# Patient Record
Sex: MALE | Race: WHITE | NOT HISPANIC OR LATINO | Employment: FULL TIME | URBAN - NONMETROPOLITAN AREA
[De-identification: names, ages, dates, MRNs, and addresses within clinical notes are randomized per-mention and may not be internally consistent; named-entity substitution may affect disease eponyms.]

---

## 2021-02-26 ENCOUNTER — IMMUNIZATION (OUTPATIENT)
Dept: VACCINE CLINIC | Facility: HOSPITAL | Age: 66
End: 2021-02-26

## 2021-02-26 PROCEDURE — 91300 HC SARSCOV02 VAC 30MCG/0.3ML IM: CPT | Performed by: INTERNAL MEDICINE

## 2021-02-26 PROCEDURE — 0001A: CPT | Performed by: INTERNAL MEDICINE

## 2021-03-22 ENCOUNTER — IMMUNIZATION (OUTPATIENT)
Dept: VACCINE CLINIC | Facility: HOSPITAL | Age: 66
End: 2021-03-22

## 2021-03-22 PROCEDURE — 91300 HC SARSCOV02 VAC 30MCG/0.3ML IM: CPT | Performed by: INTERNAL MEDICINE

## 2021-03-22 PROCEDURE — 0002A: CPT | Performed by: INTERNAL MEDICINE

## 2022-08-23 ENCOUNTER — OFFICE VISIT (OUTPATIENT)
Dept: FAMILY MEDICINE CLINIC | Facility: CLINIC | Age: 67
End: 2022-08-23

## 2022-08-23 VITALS
WEIGHT: 256 LBS | DIASTOLIC BLOOD PRESSURE: 96 MMHG | OXYGEN SATURATION: 97 % | SYSTOLIC BLOOD PRESSURE: 140 MMHG | HEART RATE: 71 BPM | TEMPERATURE: 97.7 F | HEIGHT: 73 IN | RESPIRATION RATE: 20 BRPM | BODY MASS INDEX: 33.93 KG/M2

## 2022-08-23 DIAGNOSIS — Z00.00 ANNUAL PHYSICAL EXAM: ICD-10-CM

## 2022-08-23 DIAGNOSIS — R79.9 ABNORMAL FINDING OF BLOOD CHEMISTRY, UNSPECIFIED: ICD-10-CM

## 2022-08-23 DIAGNOSIS — Z00.00 ENCOUNTER FOR MEDICAL EXAMINATION TO ESTABLISH CARE: Primary | ICD-10-CM

## 2022-08-23 DIAGNOSIS — M10.09 ACUTE IDIOPATHIC GOUT OF MULTIPLE SITES: ICD-10-CM

## 2022-08-23 DIAGNOSIS — Z23 NEED FOR TDAP VACCINATION: ICD-10-CM

## 2022-08-23 DIAGNOSIS — Z12.5 ENCOUNTER FOR SCREENING FOR MALIGNANT NEOPLASM OF PROSTATE: ICD-10-CM

## 2022-08-23 DIAGNOSIS — I10 PRIMARY HYPERTENSION: ICD-10-CM

## 2022-08-23 DIAGNOSIS — Z23 PNEUMOCOCCAL VACCINE ADMINISTERED: ICD-10-CM

## 2022-08-23 DIAGNOSIS — M81.0 AGE-RELATED OSTEOPOROSIS WITHOUT CURRENT PATHOLOGICAL FRACTURE: ICD-10-CM

## 2022-08-23 DIAGNOSIS — Z13.6 SCREENING FOR AAA (AORTIC ABDOMINAL ANEURYSM): ICD-10-CM

## 2022-08-23 PROBLEM — M10.9 GOUT OF MULTIPLE SITES: Status: ACTIVE | Noted: 2022-08-23

## 2022-08-23 PROBLEM — M19.90 ARTHRITIS: Status: ACTIVE | Noted: 2022-08-23

## 2022-08-23 LAB
BILIRUB BLD-MCNC: NEGATIVE MG/DL
CLARITY, POC: CLEAR
COLOR UR: ABNORMAL
GLUCOSE UR STRIP-MCNC: NEGATIVE MG/DL
KETONES UR QL: ABNORMAL
LEUKOCYTE EST, POC: NEGATIVE
NITRITE UR-MCNC: NEGATIVE MG/ML
PH UR: 6 [PH] (ref 5–8)
PROT UR STRIP-MCNC: ABNORMAL MG/DL
RBC # UR STRIP: ABNORMAL /UL
SP GR UR: 1.03 (ref 1–1.03)
UROBILINOGEN UR QL: NORMAL

## 2022-08-23 PROCEDURE — 90677 PCV20 VACCINE IM: CPT | Performed by: PSYCHOLOGIST

## 2022-08-23 PROCEDURE — G0009 ADMIN PNEUMOCOCCAL VACCINE: HCPCS | Performed by: PSYCHOLOGIST

## 2022-08-23 PROCEDURE — 99387 INIT PM E/M NEW PAT 65+ YRS: CPT | Performed by: PSYCHOLOGIST

## 2022-08-23 PROCEDURE — 2014F MENTAL STATUS ASSESS: CPT | Performed by: PSYCHOLOGIST

## 2022-08-23 PROCEDURE — 90715 TDAP VACCINE 7 YRS/> IM: CPT | Performed by: PSYCHOLOGIST

## 2022-08-23 PROCEDURE — 81002 URINALYSIS NONAUTO W/O SCOPE: CPT | Performed by: PSYCHOLOGIST

## 2022-08-23 PROCEDURE — 90471 IMMUNIZATION ADMIN: CPT | Performed by: PSYCHOLOGIST

## 2022-08-23 PROCEDURE — 3008F BODY MASS INDEX DOCD: CPT | Performed by: PSYCHOLOGIST

## 2022-08-23 RX ORDER — HYDROCHLOROTHIAZIDE 25 MG/1
25 TABLET ORAL
Qty: 30 TABLET | Refills: 2 | Status: SHIPPED | OUTPATIENT
Start: 2022-08-23 | End: 2022-11-14

## 2022-08-23 RX ORDER — MELOXICAM 7.5 MG/1
7.5 TABLET ORAL DAILY
COMMUNITY
End: 2022-08-23 | Stop reason: SDUPTHER

## 2022-08-23 RX ORDER — MELOXICAM 7.5 MG/1
7.5 TABLET ORAL
Qty: 30 TABLET | Refills: 2 | Status: SHIPPED | OUTPATIENT
Start: 2022-08-23 | End: 2022-11-14 | Stop reason: SDUPTHER

## 2022-08-23 RX ORDER — ALLOPURINOL 100 MG/1
100 TABLET ORAL DAILY
COMMUNITY
Start: 2022-05-31 | End: 2022-10-10 | Stop reason: SDUPTHER

## 2022-08-23 RX ORDER — HYDROCHLOROTHIAZIDE 25 MG/1
25 TABLET ORAL 2 TIMES DAILY
COMMUNITY
End: 2022-08-23 | Stop reason: SDUPTHER

## 2022-08-23 NOTE — PROGRESS NOTES
"Chief Complaint  Establish Care (Continued care of past HTN (no longer takes med))    Subjective        Barrie Rose presents to [facility] PRIMARY CARE   C/o establish care as his PCP 2. Annual Physical  3. Chronic Illness 4. Med refill  Hypertension  This is a chronic problem. The current episode started more than 1 year ago. The problem has been waxing and waning since onset. The problem is uncontrolled. Pertinent negatives include no chest pain, headaches, palpitations or shortness of breath. Risk factors for coronary artery disease include male gender and obesity. Past treatments include lifestyle changes and diuretics. Current antihypertension treatment includes lifestyle changes and diuretics. The current treatment provides moderate improvement. There are no compliance problems.  There is no history of angina, kidney disease or CAD/MI. There is no history of chronic renal disease or a thyroid problem.   Arthritis  Presents for initial visit. The disease course has been fluctuating. The condition has lasted for 10 years. He complains of pain, stiffness and joint swelling. Affected locations include the right MCP, left MCP, right PIP, left hip, right hip, left DIP, right DIP, left PIP, right knee, left toes and right toes. Associated symptoms include pain at night. Pertinent negatives include no fever. Past treatments include NSAIDs. The treatment provided moderate relief. Compliance with prior treatments has been good.       Objective   Vital Signs:  /96 (BP Location: Left arm, Patient Position: Sitting, Cuff Size: Adult)   Pulse 71   Temp 97.7 °F (36.5 °C) (Temporal)   Resp 20   Ht 185.4 cm (73\")   Wt 116 kg (256 lb)   SpO2 97%   BMI 33.78 kg/m²   Estimated body mass index is 33.78 kg/m² as calculated from the following:    Height as of this encounter: 185.4 cm (73\").    Weight as of this encounter: 116 kg (256 lb).    BMI is >= 30 and <35. (Class 1 Obesity). The following " options were offered after discussion;: exercise counseling/recommendations and nutrition counseling/recommendations      Physical Exam  Vitals and nursing note reviewed.   Constitutional:       General: He is awake.      Appearance: Normal appearance. He is well-developed and well-groomed. He is obese.   HENT:      Head: Normocephalic and atraumatic.      Jaw: There is normal jaw occlusion.      Nose: Nose normal.      Mouth/Throat:      Mouth: Mucous membranes are moist.      Pharynx: Oropharynx is clear.   Eyes:      General: Lids are normal. Vision grossly intact. Gaze aligned appropriately.      Extraocular Movements: Extraocular movements intact.      Conjunctiva/sclera: Conjunctivae normal.      Pupils: Pupils are equal, round, and reactive to light.   Neck:      Trachea: Trachea and phonation normal.   Cardiovascular:      Rate and Rhythm: Normal rate and regular rhythm.      Pulses: Normal pulses.      Heart sounds: Normal heart sounds.   Pulmonary:      Effort: Pulmonary effort is normal.      Breath sounds: Normal breath sounds.   Abdominal:      General: Abdomen is protuberant. Bowel sounds are normal.      Palpations: Abdomen is soft.   Genitourinary:     Rectum: Normal.   Musculoskeletal:         General: Normal range of motion.      Cervical back: Full passive range of motion without pain, normal range of motion and neck supple.   Lymphadenopathy:      Cervical: No cervical adenopathy.   Skin:     General: Skin is warm.      Capillary Refill: Capillary refill takes less than 2 seconds.   Neurological:      General: No focal deficit present.      Mental Status: He is alert and oriented to person, place, and time.      Cranial Nerves: Cranial nerves are intact.      Sensory: Sensation is intact.      Motor: Motor function is intact.      Coordination: Coordination is intact.      Gait: Gait is intact.      Deep Tendon Reflexes: Reflexes are normal and symmetric.   Psychiatric:         Attention and  Perception: Attention and perception normal.         Mood and Affect: Mood and affect normal.         Speech: Speech normal.         Behavior: Behavior normal.         Thought Content: Thought content normal.         Cognition and Memory: Cognition and memory normal.         Judgment: Judgment normal.        Result Review :  The following data was reviewed by: Oseas Lam MD on 08/23/2022:  NONE REVIEWED NO [facility] MED RECORDS           Assessment and Plan   Diagnoses and all orders for this visit:    1. Encounter for medical examination to establish care (Primary)    2. Annual physical exam  -     CBC & Differential; Future  -     Comprehensive Metabolic Panel; Future  -     Lipid Panel; Future  -     Hemoglobin A1c; Future  -     TSH; Future  -     Hepatitis C Antibody; Future  -     PSA Screen; Future  -     Vitamin D 25 Hydroxy; Future  -     Vitamin B12; Future  -     Uric Acid  -     POC Urinalysis Dipstick    3. Screening for AAA (aortic abdominal aneurysm)  -     Abdominal Aortic Aneurysm Screening Medicare CAR; Future    4. Need for Tdap vaccination    5. Pneumococcal vaccine administered    6. Primary hypertension  Assessment & Plan:  Hypertension is improving with treatment.  Continue current treatment regimen.  Dietary sodium restriction.  Weight loss.  Regular aerobic exercise.  Blood pressure will be reassessed at the next regular appointment.    Orders:  -     CBC & Differential; Future  -     Comprehensive Metabolic Panel; Future  -     Lipid Panel; Future  -     Hemoglobin A1c; Future  -     TSH; Future  -     Hepatitis C Antibody; Future  -     PSA Screen; Future  -     Vitamin D 25 Hydroxy; Future  -     Vitamin B12; Future  -     Uric Acid  -     POC Urinalysis Dipstick    7. Acute idiopathic gout of multiple sites    8. Abnormal finding of blood chemistry, unspecified   -     Hemoglobin A1c; Future    9. Encounter for screening for malignant neoplasm of prostate   -     PSA Screen;  Future    10. Age-related osteoporosis without current pathological fracture   -     Vitamin D 25 Hydroxy; Future    Other orders  -     hydroCHLOROthiazide (HYDRODIURIL) 25 MG tablet; Take 1 tablet by mouth Daily With Breakfast for 90 days.  Dispense: 30 tablet; Refill: 2  -     meloxicam (MOBIC) 7.5 MG tablet; Take 1 tablet by mouth Daily With Lunch for 90 days.  Dispense: 30 tablet; Refill: 2  -     Tdap Vaccine Greater Than or Equal To 6yo IM  -     Pneumococcal Conjugate Vaccine 20-Valent (PCV20)        I spent 27 minutes caring for Barrie on this date of service. This time includes time spent by me in the following activities:reviewing tests, obtaining and/or reviewing a separately obtained history, performing a medically appropriate examination and/or evaluation , ordering medications, tests, or procedures and documenting information in the medical record     The preventive exam has been reviewed in detail.  The patient has been fully counseled on preventative guidelines for vaccines, cancer screenings, and other health maintenance needs.   The patient has been counseled on guidelines for maintaining a lifestyle to promote good health and to minimize chronic diseases.  The patient has been assisted with scheduling these healthcare procedures for the coming year and given a written document of health maintenance and anticipatory guidance for age with the AVS.     Follow Up   Return in about 1 day (around 8/24/2022) for RTC FASTING LABS & 11/23/22  3 MOS FOLOW UP /FATING LABS ( MED REFILL / CHRONIC ILLNESS ).  Patient was given instructions and counseling regarding his condition or for health maintenance advice. Please see specific information pulled into the AVS if appropriate.         This document has been electronically signed by Oseas Lam MD  August 24, 2022 12:20 EDT

## 2022-08-24 ENCOUNTER — LAB (OUTPATIENT)
Dept: FAMILY MEDICINE CLINIC | Facility: CLINIC | Age: 67
End: 2022-08-24

## 2022-08-24 DIAGNOSIS — R79.9 ABNORMAL FINDING OF BLOOD CHEMISTRY, UNSPECIFIED: ICD-10-CM

## 2022-08-24 DIAGNOSIS — Z12.5 ENCOUNTER FOR SCREENING FOR MALIGNANT NEOPLASM OF PROSTATE: ICD-10-CM

## 2022-08-24 DIAGNOSIS — Z00.00 ANNUAL PHYSICAL EXAM: ICD-10-CM

## 2022-08-24 DIAGNOSIS — M81.0 AGE-RELATED OSTEOPOROSIS WITHOUT CURRENT PATHOLOGICAL FRACTURE: ICD-10-CM

## 2022-08-24 DIAGNOSIS — I10 PRIMARY HYPERTENSION: ICD-10-CM

## 2022-08-24 LAB
ALBUMIN SERPL-MCNC: 4.16 G/DL (ref 3.5–5.2)
ALBUMIN/GLOB SERPL: 1.5 G/DL
ALP SERPL-CCNC: 75 U/L (ref 39–117)
ALT SERPL W P-5'-P-CCNC: 31 U/L (ref 1–41)
ANION GAP SERPL CALCULATED.3IONS-SCNC: 10.9 MMOL/L (ref 5–15)
AST SERPL-CCNC: 30 U/L (ref 1–40)
BASOPHILS # BLD AUTO: 0.05 10*3/MM3 (ref 0–0.2)
BASOPHILS NFR BLD AUTO: 0.6 % (ref 0–1.5)
BILIRUB SERPL-MCNC: 0.4 MG/DL (ref 0–1.2)
BUN SERPL-MCNC: 18 MG/DL (ref 8–23)
BUN/CREAT SERPL: 17.8 (ref 7–25)
CALCIUM SPEC-SCNC: 9.3 MG/DL (ref 8.6–10.5)
CHLORIDE SERPL-SCNC: 101 MMOL/L (ref 98–107)
CHOLEST SERPL-MCNC: 212 MG/DL (ref 0–200)
CO2 SERPL-SCNC: 28.1 MMOL/L (ref 22–29)
CREAT SERPL-MCNC: 1.01 MG/DL (ref 0.76–1.27)
DEPRECATED RDW RBC AUTO: 43.8 FL (ref 37–54)
EGFRCR SERPLBLD CKD-EPI 2021: 81 ML/MIN/1.73
EOSINOPHIL # BLD AUTO: 0.31 10*3/MM3 (ref 0–0.4)
EOSINOPHIL NFR BLD AUTO: 3.7 % (ref 0.3–6.2)
ERYTHROCYTE [DISTWIDTH] IN BLOOD BY AUTOMATED COUNT: 12.9 % (ref 12.3–15.4)
GLOBULIN UR ELPH-MCNC: 2.7 GM/DL
GLUCOSE SERPL-MCNC: 97 MG/DL (ref 65–99)
HBA1C MFR BLD: 5.8 % (ref 4.8–5.6)
HCT VFR BLD AUTO: 47.1 % (ref 37.5–51)
HCV AB SER DONR QL: NORMAL
HDLC SERPL-MCNC: 46 MG/DL (ref 40–60)
HGB BLD-MCNC: 16.2 G/DL (ref 13–17.7)
IMM GRANULOCYTES # BLD AUTO: 0.03 10*3/MM3 (ref 0–0.05)
IMM GRANULOCYTES NFR BLD AUTO: 0.4 % (ref 0–0.5)
LDLC SERPL CALC-MCNC: 131 MG/DL (ref 0–100)
LDLC/HDLC SERPL: 2.77 {RATIO}
LYMPHOCYTES # BLD AUTO: 2.35 10*3/MM3 (ref 0.7–3.1)
LYMPHOCYTES NFR BLD AUTO: 27.9 % (ref 19.6–45.3)
MCH RBC QN AUTO: 31.4 PG (ref 26.6–33)
MCHC RBC AUTO-ENTMCNC: 34.4 G/DL (ref 31.5–35.7)
MCV RBC AUTO: 91.3 FL (ref 79–97)
MONOCYTES # BLD AUTO: 0.78 10*3/MM3 (ref 0.1–0.9)
MONOCYTES NFR BLD AUTO: 9.3 % (ref 5–12)
NEUTROPHILS NFR BLD AUTO: 4.9 10*3/MM3 (ref 1.7–7)
NEUTROPHILS NFR BLD AUTO: 58.1 % (ref 42.7–76)
NRBC BLD AUTO-RTO: 0 /100 WBC (ref 0–0.2)
PLATELET # BLD AUTO: 200 10*3/MM3 (ref 140–450)
PMV BLD AUTO: 11.4 FL (ref 6–12)
POTASSIUM SERPL-SCNC: 3.7 MMOL/L (ref 3.5–5.2)
PROT SERPL-MCNC: 6.9 G/DL (ref 6–8.5)
RBC # BLD AUTO: 5.16 10*6/MM3 (ref 4.14–5.8)
SODIUM SERPL-SCNC: 140 MMOL/L (ref 136–145)
TRIGL SERPL-MCNC: 194 MG/DL (ref 0–150)
TSH SERPL DL<=0.05 MIU/L-ACNC: 2.24 UIU/ML (ref 0.27–4.2)
URATE SERPL-MCNC: 7.1 MG/DL (ref 3.4–7)
VLDLC SERPL-MCNC: 35 MG/DL (ref 5–40)
WBC NRBC COR # BLD: 8.42 10*3/MM3 (ref 3.4–10.8)

## 2022-08-24 PROCEDURE — 82306 VITAMIN D 25 HYDROXY: CPT | Performed by: PSYCHOLOGIST

## 2022-08-24 PROCEDURE — 84443 ASSAY THYROID STIM HORMONE: CPT | Performed by: PSYCHOLOGIST

## 2022-08-24 PROCEDURE — 82607 VITAMIN B-12: CPT | Performed by: PSYCHOLOGIST

## 2022-08-24 PROCEDURE — 83036 HEMOGLOBIN GLYCOSYLATED A1C: CPT | Performed by: PSYCHOLOGIST

## 2022-08-24 PROCEDURE — G0103 PSA SCREENING: HCPCS | Performed by: PSYCHOLOGIST

## 2022-08-24 PROCEDURE — 80061 LIPID PANEL: CPT | Performed by: PSYCHOLOGIST

## 2022-08-24 PROCEDURE — 86803 HEPATITIS C AB TEST: CPT | Performed by: PSYCHOLOGIST

## 2022-08-24 PROCEDURE — 84550 ASSAY OF BLOOD/URIC ACID: CPT | Performed by: PSYCHOLOGIST

## 2022-08-24 PROCEDURE — 80053 COMPREHEN METABOLIC PANEL: CPT | Performed by: PSYCHOLOGIST

## 2022-08-24 PROCEDURE — 85025 COMPLETE CBC W/AUTO DIFF WBC: CPT | Performed by: PSYCHOLOGIST

## 2022-08-25 LAB
25(OH)D3 SERPL-MCNC: 30.1 NG/ML (ref 30–100)
PSA SERPL-MCNC: 0.4 NG/ML (ref 0–4)
VIT B12 BLD-MCNC: 435 PG/ML (ref 211–946)

## 2022-08-26 NOTE — PROGRESS NOTES
Call patient regarding lab results and discussed those with him.  Advised him that his uric acid level was still elevated so we will adjust his dose and increase it to twice a day dosing for his allopurinol.  Patient understands.

## 2022-10-10 ENCOUNTER — TELEPHONE (OUTPATIENT)
Dept: FAMILY MEDICINE CLINIC | Facility: CLINIC | Age: 67
End: 2022-10-10

## 2022-10-10 RX ORDER — ALLOPURINOL 100 MG/1
100 TABLET ORAL DAILY
Status: CANCELLED | OUTPATIENT
Start: 2022-10-10

## 2022-10-10 RX ORDER — ALLOPURINOL 100 MG/1
100 TABLET ORAL DAILY
Qty: 30 TABLET | Refills: 2 | Status: SHIPPED | OUTPATIENT
Start: 2022-10-10 | End: 2023-01-08

## 2022-10-10 NOTE — TELEPHONE ENCOUNTER
Rx Refill Note  Requested Prescriptions     Pending Prescriptions Disp Refills   • allopurinol (ZYLOPRIM) 100 MG tablet       Sig: Take 1 tablet by mouth Daily.      Last office visit with prescribing clinician: 8/23/2022      Next office visit with prescribing clinician: 11/22/2022            Kisha Fry RN  10/10/22, 12:40 EDT

## 2022-10-10 NOTE — TELEPHONE ENCOUNTER
Caller: Barrie Rose    Relationship: Self    Best call back number: [phone]    Requested Prescriptions:   Requested Prescriptions     Pending Prescriptions Disp Refills   • allopurinol (ZYLOPRIM) 100 MG tablet       Sig: Take 1 tablet by mouth Daily.        Pharmacy where request should be sent: [id number] [place] [id number] AT [place] [address] & [facility] [phone] [facility] [phone] [facility]     Additional details provided by patient: COMPLETELY OUT OF MEDICATION    Does the patient have less than a 3 day supply:  [x] Yes  [] No    Ruben Mallory Rep   10/10/22 10:24 EDT

## 2022-11-14 ENCOUNTER — OFFICE VISIT (OUTPATIENT)
Dept: FAMILY MEDICINE CLINIC | Facility: CLINIC | Age: 67
End: 2022-11-14

## 2022-11-14 VITALS
SYSTOLIC BLOOD PRESSURE: 148 MMHG | RESPIRATION RATE: 20 BRPM | HEART RATE: 73 BPM | OXYGEN SATURATION: 99 % | HEIGHT: 73 IN | TEMPERATURE: 97.1 F | BODY MASS INDEX: 34.46 KG/M2 | DIASTOLIC BLOOD PRESSURE: 94 MMHG | WEIGHT: 260 LBS

## 2022-11-14 DIAGNOSIS — M19.90 ARTHRITIS: ICD-10-CM

## 2022-11-14 DIAGNOSIS — E78.2 MIXED HYPERLIPIDEMIA: ICD-10-CM

## 2022-11-14 DIAGNOSIS — I10 PRIMARY HYPERTENSION: Primary | ICD-10-CM

## 2022-11-14 DIAGNOSIS — J20.9 ACUTE BRONCHITIS, UNSPECIFIED ORGANISM: ICD-10-CM

## 2022-11-14 DIAGNOSIS — R05.1 ACUTE COUGH: ICD-10-CM

## 2022-11-14 DIAGNOSIS — Z79.899 MEDICATION DOSE INCREASED: ICD-10-CM

## 2022-11-14 LAB
EXPIRATION DATE: NORMAL
FLUAV AG UPPER RESP QL IA.RAPID: NOT DETECTED
FLUBV AG UPPER RESP QL IA.RAPID: NOT DETECTED
INTERNAL CONTROL: NORMAL
Lab: NORMAL
SARS-COV-2 AG UPPER RESP QL IA.RAPID: NOT DETECTED

## 2022-11-14 PROCEDURE — 87428 SARSCOV & INF VIR A&B AG IA: CPT | Performed by: PSYCHOLOGIST

## 2022-11-14 PROCEDURE — 99214 OFFICE O/P EST MOD 30 MIN: CPT | Performed by: PSYCHOLOGIST

## 2022-11-14 RX ORDER — ATORVASTATIN CALCIUM 20 MG/1
20 TABLET, FILM COATED ORAL NIGHTLY
Qty: 30 TABLET | Refills: 2 | Status: SHIPPED | OUTPATIENT
Start: 2022-11-14 | End: 2023-02-03

## 2022-11-14 RX ORDER — LISINOPRIL AND HYDROCHLOROTHIAZIDE 20; 12.5 MG/1; MG/1
1 TABLET ORAL
Qty: 30 TABLET | Refills: 2 | Status: SHIPPED | OUTPATIENT
Start: 2022-11-14 | End: 2022-11-22

## 2022-11-14 RX ORDER — GUAIFENESIN AND CODEINE PHOSPHATE 100; 10 MG/5ML; MG/5ML
5 SOLUTION ORAL 3 TIMES DAILY
Qty: 150 ML | Refills: 0 | Status: SHIPPED | OUTPATIENT
Start: 2022-11-14 | End: 2022-11-24

## 2022-11-14 RX ORDER — MELOXICAM 7.5 MG/1
7.5 TABLET ORAL
Qty: 30 TABLET | Refills: 2 | Status: SHIPPED | OUTPATIENT
Start: 2022-11-14 | End: 2023-02-12

## 2022-11-14 RX ORDER — AZITHROMYCIN 250 MG/1
TABLET, FILM COATED ORAL
Qty: 6 TABLET | Refills: 1 | Status: SHIPPED | OUTPATIENT
Start: 2022-11-14 | End: 2022-11-29

## 2022-11-14 NOTE — PROGRESS NOTES
Chief Complaint  Hypertension (X 2 weeks, headaches) and Cough (X 2 weeks)    Subjective        Barrie Rose presents to [facility] PRIMARY CARE   C.O FOLLOW UP CHRONIC ILLNESS 2. ACUTE COUGH - DENIES EXPOSURE TO COVID 2. MED REFILL   Hypertension  This is a chronic problem. The current episode started more than 1 year ago. The problem has been waxing and waning since onset. The problem is uncontrolled. Pertinent negatives include no chest pain, headaches, palpitations or shortness of breath. Risk factors for coronary artery disease include obesity, male gender, dyslipidemia and smoking/tobacco exposure. Past treatments include lifestyle changes and diuretics. Current antihypertension treatment includes lifestyle changes and diuretics. The current treatment provides mild improvement. There are no compliance problems.  There is no history of angina, kidney disease or CAD/MI. There is no history of chronic renal disease or a thyroid problem.   Cough  This is a new problem. The current episode started 1 to 4 weeks ago. The problem has been waxing and waning. The problem occurs every few hours. Associated symptoms include ear pain and rhinorrhea. Pertinent negatives include no chest pain, fever, headaches, sore throat or shortness of breath. He has tried OTC cough suppressant for the symptoms. The treatment provided mild relief. There is no history of asthma or COPD.   Hyperlipidemia  This is a new problem. This is a new diagnosis. The problem is uncontrolled. Recent lipid tests were reviewed and are high. Exacerbating diseases include obesity. He has no history of chronic renal disease. Factors aggravating his hyperlipidemia include smoking. Pertinent negatives include no chest pain, leg pain or shortness of breath. Current antihyperlipidemic treatment includes exercise and diet change. Risk factors for coronary artery disease include obesity and hypertension.   Arthritis  Presents for follow-up  "visit. The symptoms have been improving. Pertinent negatives include no dry mouth, fatigue or fever. Compliance with total regimen is %. Compliance with medications is %.       Objective   Vital Signs:  /94 (BP Location: Left arm, Patient Position: Sitting, Cuff Size: Adult)   Pulse 73   Temp 97.1 °F (36.2 °C) (Temporal)   Resp 20   Ht 185.4 cm (73\")   Wt 118 kg (260 lb)   SpO2 99%   BMI 34.30 kg/m²   Estimated body mass index is 34.3 kg/m² as calculated from the following:    Height as of this encounter: 185.4 cm (73\").    Weight as of this encounter: 118 kg (260 lb).    BMI is >= 30 and <35. (Class 1 Obesity). The following options were offered after discussion;: exercise counseling/recommendations and nutrition counseling/recommendations      Physical Exam  Vitals and nursing note reviewed.   Constitutional:       Appearance: Normal appearance.   HENT:      Head: Normocephalic.      Right Ear: Tympanic membrane is bulging.      Left Ear: Tympanic membrane is bulging.      Nose: Nose normal.      Mouth/Throat:      Mouth: Mucous membranes are moist.   Eyes:      Extraocular Movements: Extraocular movements intact.      Pupils: Pupils are equal, round, and reactive to light.   Cardiovascular:      Rate and Rhythm: Normal rate and regular rhythm.      Pulses: Normal pulses.      Heart sounds: Normal heart sounds.   Pulmonary:      Effort: Pulmonary effort is normal.      Breath sounds: Normal breath sounds. No decreased air movement. No wheezing.   Abdominal:      General: Abdomen is protuberant. Bowel sounds are normal.      Palpations: Abdomen is soft.   Musculoskeletal:         General: Normal range of motion.      Cervical back: Normal range of motion and neck supple.   Skin:     General: Skin is warm.      Capillary Refill: Capillary refill takes less than 2 seconds.   Neurological:      General: No focal deficit present.      Mental Status: He is alert and oriented to person, place, " and time.   Psychiatric:         Mood and Affect: Mood normal.        Result Review :  The following data was reviewed by: Oseas Lam MD on 11/14/2022:  Common labs    Common Labs 8/24/22 8/24/22 8/24/22 8/24/22 8/24/22 8/24/22    0902 0902 0902 0902 0902 0902   Glucose    97     BUN    18     Creatinine    1.01     Sodium    140     Potassium    3.7     Chloride    101     Calcium    9.3     Albumin    4.16     Total Bilirubin    0.4     Alkaline Phosphatase    75     AST (SGOT)    30     ALT (SGPT)    31     WBC 8.42        Hemoglobin 16.2        Hematocrit 47.1        Platelets 200        Total Cholesterol   212 (A)      Triglycerides   194 (A)      HDL Cholesterol   46      LDL Cholesterol    131 (A)      Hemoglobin A1C     5.80 (A)    PSA      0.401   Uric Acid  7.1 (A)       (A) Abnormal value                Assessment and Plan   Diagnoses and all orders for this visit:    1. Primary hypertension (Primary)  Comments:  WILL CHANGE BP MEDICATION AND JOAO IN 2 WEEKS  Assessment & Plan:  Hypertension is worsening.  Dietary sodium restriction.  Weight loss.  Regular aerobic exercise.  Stop smoking.  Medication changes per orders.  Blood pressure will be reassessed in 3 months.    Orders:  -     Lipid Panel; Future  -     Comprehensive Metabolic Panel; Future  -     Hemoglobin A1c; Future  -     Uric acid; Future    2. Mixed hyperlipidemia  Assessment & Plan:  Lipid abnormalities are newly identified.  Nutritional counseling was provided. and Pharmacotherapy as ordered.  Lipids will be reassessed in 3 months.    Orders:  -     Lipid Panel; Future  -     Comprehensive Metabolic Panel; Future  -     Hemoglobin A1c; Future  -     Uric acid; Future    3. Arthritis  Comments:  JOAO URIC ACID LEVEL   Orders:  -     Lipid Panel; Future  -     Comprehensive Metabolic Panel; Future  -     Hemoglobin A1c; Future  -     Uric acid; Future    4. Medication dose increased    5. Acute cough  Comments:  LABS:  COVID/FLU  A/ B   Orders:  -     POCT SARS-CoV-2 Antigen ANTONELLA + Flu    6. Acute bronchitis, unspecified organism  -     guaiFENesin-codeine (GUAIFENESIN AC) 100-10 MG/5ML liquid; Take 5 mL by mouth 3 (Three) Times a Day for 10 days.  Dispense: 150 mL; Refill: 0    Other orders  -     atorvastatin (Lipitor) 20 MG tablet; Take 1 tablet by mouth Every Night for 90 days.  Dispense: 30 tablet; Refill: 2  -     lisinopril-hydrochlorothiazide (PRINZIDE,ZESTORETIC) 20-12.5 MG per tablet; Take 1 tablet by mouth Daily With Breakfast for 90 days.  Dispense: 30 tablet; Refill: 2  -     meloxicam (MOBIC) 7.5 MG tablet; Take 1 tablet by mouth Daily With Lunch for 90 days.  Dispense: 30 tablet; Refill: 2  -     azithromycin (Zithromax) 250 MG tablet; Take 2 tablets the first day, then 1 tablet daily for 4 days.  Dispense: 6 tablet; Refill: 1      I spent 21 minutes caring for Barrie on this date of service. This time includes time spent by me in the following activities:reviewing tests, performing a medically appropriate examination and/or evaluation , counseling and educating the patient/family/caregiver, ordering medications, tests, or procedures and documenting information in the medical record     Follow Up   Return in about 2 weeks (around 11/28/2022) for Recheck-- BP CHANGE IN MEDICATION & , Recheck, RTC FASTING LABS ON 2/16/23.  Patient was given instructions and counseling regarding his condition or for health maintenance advice. Please see specific information pulled into the AVS if appropriate.           This document has been electronically signed by Oseas Lam MD  November 14, 2022 14:03 EST

## 2022-11-22 ENCOUNTER — TELEPHONE (OUTPATIENT)
Dept: FAMILY MEDICINE CLINIC | Facility: CLINIC | Age: 67
End: 2022-11-22

## 2022-11-22 RX ORDER — LOSARTAN POTASSIUM 25 MG/1
25 TABLET ORAL
Qty: 30 TABLET | Refills: 0 | Status: SHIPPED | OUTPATIENT
Start: 2022-11-22 | End: 2022-12-13

## 2022-11-22 NOTE — TELEPHONE ENCOUNTER
Caller: Barrie Rose    Relationship: Self    Best call back number:     [phone]        What medications are you currently taking:   Current Outpatient Medications on File Prior to Visit   Medication Sig Dispense Refill   • allopurinol (ZYLOPRIM) 100 MG tablet Take 1 tablet by mouth Daily for 90 days. 30 tablet 2   • atorvastatin (Lipitor) 20 MG tablet Take 1 tablet by mouth Every Night for 90 days. 30 tablet 2   • azithromycin (Zithromax) 250 MG tablet Take 2 tablets the first day, then 1 tablet daily for 4 days. 6 tablet 1   • guaiFENesin-codeine (GUAIFENESIN AC) 100-10 MG/5ML liquid Take 5 mL by mouth 3 (Three) Times a Day for 10 days. 150 mL 0   • lisinopril-hydrochlorothiazide (PRINZIDE,ZESTORETIC) 20-12.5 MG per tablet Take 1 tablet by mouth Daily With Breakfast for 90 days. 30 tablet 2   • meloxicam (MOBIC) 7.5 MG tablet Take 1 tablet by mouth Daily With Lunch for 90 days. 30 tablet 2     No current facility-administered medications on file prior to visit.          When did you start taking these medications: 11.14.22  Which medication are you concerned about: LISINOPRIL  Who prescribed you this medication:DR OCAMPO    What are your concerns: DIARRHEA AND HEADACHES , SO HE DISCONTINUED THE MEDICATION      PLEASE CALL AND ADVISE

## 2022-11-23 RX ORDER — LOSARTAN POTASSIUM 25 MG/1
TABLET ORAL
Qty: 90 TABLET | OUTPATIENT
Start: 2022-11-23

## 2022-11-29 ENCOUNTER — OFFICE VISIT (OUTPATIENT)
Dept: FAMILY MEDICINE CLINIC | Facility: CLINIC | Age: 67
End: 2022-11-29

## 2022-11-29 VITALS
HEART RATE: 73 BPM | WEIGHT: 259 LBS | TEMPERATURE: 97.1 F | BODY MASS INDEX: 34.33 KG/M2 | OXYGEN SATURATION: 96 % | SYSTOLIC BLOOD PRESSURE: 138 MMHG | DIASTOLIC BLOOD PRESSURE: 92 MMHG | RESPIRATION RATE: 20 BRPM | HEIGHT: 73 IN

## 2022-11-29 DIAGNOSIS — I10 PRIMARY HYPERTENSION: Primary | ICD-10-CM

## 2022-11-29 DIAGNOSIS — Z13.820 SCREENING FOR OSTEOPOROSIS: ICD-10-CM

## 2022-11-29 DIAGNOSIS — Z79.899 MEDICATION DOSE CHANGED: ICD-10-CM

## 2022-11-29 DIAGNOSIS — E78.2 MIXED HYPERLIPIDEMIA: ICD-10-CM

## 2022-11-29 DIAGNOSIS — I10 PRIMARY HYPERTENSION: ICD-10-CM

## 2022-11-29 DIAGNOSIS — M19.90 ARTHRITIS: ICD-10-CM

## 2022-11-29 DIAGNOSIS — Z01.89 EXAMINATION FOR, LABORATORY: ICD-10-CM

## 2022-11-29 LAB
ALBUMIN SERPL-MCNC: 4.09 G/DL (ref 3.5–5.2)
ALBUMIN/GLOB SERPL: 1.4 G/DL
ALP SERPL-CCNC: 79 U/L (ref 39–117)
ALT SERPL W P-5'-P-CCNC: 51 U/L (ref 1–41)
ANION GAP SERPL CALCULATED.3IONS-SCNC: 8.3 MMOL/L (ref 5–15)
AST SERPL-CCNC: 33 U/L (ref 1–40)
BILIRUB SERPL-MCNC: 0.5 MG/DL (ref 0–1.2)
BUN SERPL-MCNC: 17 MG/DL (ref 8–23)
BUN/CREAT SERPL: 18.3 (ref 7–25)
CALCIUM SPEC-SCNC: 8.9 MG/DL (ref 8.6–10.5)
CHLORIDE SERPL-SCNC: 102 MMOL/L (ref 98–107)
CHOLEST SERPL-MCNC: 141 MG/DL (ref 0–200)
CO2 SERPL-SCNC: 28.7 MMOL/L (ref 22–29)
CREAT SERPL-MCNC: 0.93 MG/DL (ref 0.76–1.27)
EGFRCR SERPLBLD CKD-EPI 2021: 89.4 ML/MIN/1.73
GLOBULIN UR ELPH-MCNC: 3 GM/DL
GLUCOSE SERPL-MCNC: 101 MG/DL (ref 65–99)
HBA1C MFR BLD: 5.5 % (ref 4.8–5.6)
HDLC SERPL-MCNC: 50 MG/DL (ref 40–60)
LDLC SERPL CALC-MCNC: 67 MG/DL (ref 0–100)
LDLC/HDLC SERPL: 1.27 {RATIO}
POTASSIUM SERPL-SCNC: 4 MMOL/L (ref 3.5–5.2)
PROT SERPL-MCNC: 7.1 G/DL (ref 6–8.5)
SODIUM SERPL-SCNC: 139 MMOL/L (ref 136–145)
TRIGL SERPL-MCNC: 137 MG/DL (ref 0–150)
URATE SERPL-MCNC: 6.8 MG/DL (ref 3.4–7)
VLDLC SERPL-MCNC: 24 MG/DL (ref 5–40)

## 2022-11-29 PROCEDURE — 84550 ASSAY OF BLOOD/URIC ACID: CPT | Performed by: PSYCHOLOGIST

## 2022-11-29 PROCEDURE — 99213 OFFICE O/P EST LOW 20 MIN: CPT | Performed by: PSYCHOLOGIST

## 2022-11-29 PROCEDURE — 83036 HEMOGLOBIN GLYCOSYLATED A1C: CPT | Performed by: PSYCHOLOGIST

## 2022-11-29 PROCEDURE — 80053 COMPREHEN METABOLIC PANEL: CPT | Performed by: PSYCHOLOGIST

## 2022-11-29 PROCEDURE — 80061 LIPID PANEL: CPT | Performed by: PSYCHOLOGIST

## 2022-11-29 RX ORDER — HYDROCHLOROTHIAZIDE 12.5 MG/1
12.5 TABLET ORAL
Qty: 30 TABLET | Refills: 2 | Status: SHIPPED | OUTPATIENT
Start: 2022-11-29 | End: 2022-12-13

## 2022-11-29 NOTE — PROGRESS NOTES
"Chief Complaint  Follow-up (Continuation of care rt bp med change.)    Subjective        Barrie Rose presents to [facility] PRIMARY CARE   C/O FOLLOW UP HYPERTENSION 2. FASTING LABS 3. NEW MEDICATION  Hypertension  This is a new problem. The current episode started 1 to 4 weeks ago. The problem has been waxing and waning since onset. The problem is uncontrolled. Pertinent negatives include no chest pain, headaches, palpitations or shortness of breath. Risk factors for coronary artery disease include obesity, smoking/tobacco exposure, male gender and dyslipidemia. Past treatments include lifestyle changes and angiotensin blockers. Current antihypertension treatment includes lifestyle changes, angiotensin blockers and diuretics. The current treatment provides moderate improvement. There are no compliance problems.  There is no history of angina, kidney disease or CAD/MI. There is no history of chronic renal disease or a thyroid problem.       Objective   Vital Signs:  /92 (BP Location: Left arm, Patient Position: Sitting, Cuff Size: Adult)   Pulse 73   Temp 97.1 °F (36.2 °C) (Skin)   Resp 20   Ht 185.4 cm (72.99\")   Wt 117 kg (259 lb)   SpO2 96%   BMI 34.18 kg/m²   Estimated body mass index is 34.18 kg/m² as calculated from the following:    Height as of this encounter: 185.4 cm (72.99\").    Weight as of this encounter: 117 kg (259 lb).    BMI is >= 30 and <35. (Class 1 Obesity). The following options were offered after discussion;: exercise counseling/recommendations and nutrition counseling/recommendations      Physical Exam  Vitals and nursing note reviewed.   Constitutional:       Appearance: Normal appearance. He is obese.   HENT:      Head: Normocephalic.      Right Ear: Tympanic membrane normal.      Left Ear: Tympanic membrane normal.      Nose: Nose normal.      Mouth/Throat:      Mouth: Mucous membranes are moist.   Eyes:      Extraocular Movements: Extraocular movements " intact.      Pupils: Pupils are equal, round, and reactive to light.   Cardiovascular:      Rate and Rhythm: Normal rate and regular rhythm.      Pulses: Normal pulses.      Heart sounds: Normal heart sounds.   Pulmonary:      Effort: Pulmonary effort is normal.      Breath sounds: Normal breath sounds.   Abdominal:      General: Abdomen is protuberant. Bowel sounds are normal.      Palpations: Abdomen is soft.   Musculoskeletal:         General: Normal range of motion.      Cervical back: Normal range of motion and neck supple.   Skin:     General: Skin is warm.      Capillary Refill: Capillary refill takes less than 2 seconds.   Neurological:      General: No focal deficit present.      Mental Status: He is alert and oriented to person, place, and time.   Psychiatric:         Mood and Affect: Mood normal.        Result Review :  The following data was reviewed by: Oseas Lam MD on 11/29/2022:  Common labs    Common Labs 8/24/22 8/24/22 8/24/22 8/24/22 8/24/22 8/24/22    0902 0902 0902 0902 0902 0902   Glucose    97     BUN    18     Creatinine    1.01     Sodium    140     Potassium    3.7     Chloride    101     Calcium    9.3     Albumin    4.16     Total Bilirubin    0.4     Alkaline Phosphatase    75     AST (SGOT)    30     ALT (SGPT)    31     WBC 8.42        Hemoglobin 16.2        Hematocrit 47.1        Platelets 200        Total Cholesterol   212 (A)      Triglycerides   194 (A)      HDL Cholesterol   46      LDL Cholesterol    131 (A)      Hemoglobin A1C     5.80 (A)    PSA      0.401   Uric Acid  7.1 (A)       (A) Abnormal value              Assessment and Plan   Diagnoses and all orders for this visit:    1. Primary hypertension (Primary)  Comments:  ADJUSTED MEDS TODAY AND JOAO AGAIN IN 2-3 WEEKS    2. Medication dose changed    3. Examination for, laboratory    4. Screening for osteoporosis      I spent 20 minutes caring for Barrie on this date of service. This time includes time spent by  me in the following activities:reviewing tests, performing a medically appropriate examination and/or evaluation , counseling and educating the patient/family/caregiver, ordering medications, tests, or procedures and documenting information in the medical record     Follow Up   Return in about 2 weeks (around 12/13/2022) for Recheck-- BP NEW MED STARTED .  Patient was given instructions and counseling regarding his condition or for health maintenance advice. Please see specific information pulled into the AVS if appropriate.           This document has been electronically signed by Oseas Lam MD  November 29, 2022 10:56 EST

## 2022-12-05 NOTE — PROGRESS NOTES
Call patient regarding lab results and reassured.  I advised him that all of his labs were really good this time and a lot of test results are normal.  He will continue taking his medication and lifestyle modification with diet and exercise.  Patient reassured.

## 2022-12-08 RX ORDER — AZITHROMYCIN 250 MG/1
TABLET, FILM COATED ORAL
Qty: 6 TABLET | Refills: 1 | OUTPATIENT
Start: 2022-12-08

## 2022-12-13 ENCOUNTER — TELEMEDICINE (OUTPATIENT)
Dept: FAMILY MEDICINE CLINIC | Facility: CLINIC | Age: 67
End: 2022-12-13

## 2022-12-13 DIAGNOSIS — Z79.899 MEDICATION DOSE CHANGED: ICD-10-CM

## 2022-12-13 DIAGNOSIS — M54.9 MUSCULOSKELETAL BACK PAIN: ICD-10-CM

## 2022-12-13 DIAGNOSIS — I10 PRIMARY HYPERTENSION: Primary | ICD-10-CM

## 2022-12-13 PROCEDURE — 99213 OFFICE O/P EST LOW 20 MIN: CPT | Performed by: PSYCHOLOGIST

## 2022-12-13 RX ORDER — METHOCARBAMOL 750 MG/1
750 TABLET, FILM COATED ORAL 3 TIMES DAILY
Qty: 30 TABLET | Refills: 1 | Status: SHIPPED | OUTPATIENT
Start: 2022-12-13 | End: 2022-12-23

## 2022-12-13 RX ORDER — LOSARTAN POTASSIUM AND HYDROCHLOROTHIAZIDE 12.5; 5 MG/1; MG/1
1 TABLET ORAL DAILY
Qty: 30 TABLET | Refills: 2 | Status: SHIPPED | OUTPATIENT
Start: 2022-12-13 | End: 2023-01-13 | Stop reason: SDUPTHER

## 2022-12-14 NOTE — ASSESSMENT & PLAN NOTE
Hypertension is improving with treatment.  Continue current treatment regimen.  Dietary sodium restriction.  Weight loss.  Regular aerobic exercise.  Blood pressure will be reassessed at the next regular appointment.     The patient reports adjusting his medication at home where he was taking 2 of the 12.5 mg hydrochlorothiazide.  At this time I advised the patient that we will finish off the current medications that he has with his current dosing.  And I will adjust his medication further and have him take losartan 50 mg with 12.5 mg hydrochlorothiazide.  He was advised to continue monitoring blood pressure at home weekly.  We will see him back in the office for the follow-up visit with his new medication.  He is to call the office if he has any additional symptoms or unable to tolerate current dose.  Patient understanding counseled.

## 2022-12-14 NOTE — PROGRESS NOTES
Subjective   Barrie Rose is a 67 y.o. male for telehealth video visit as her PCP.  This visit is to follow-up on his blood pressure.  He was monitoring his blood pressure at home.  Start him on medications to help better control his blood pressure.  His last blood pressure check at home was 120/79.  He is currently on losartan and hydrochlorothiazide.  He reports that if he takes 2 of the 12.5 mg hydrochlorothiazide he finds that his blood pressure is under better control.  He is currently on losartan 25 mg.  He denies any chest pain or shortness of air today.  #2 he is complaining of back pain.  He was raking leaves this past Friday and continues to have lower back pain.  He denies any fall or any injury.  He reports having had sciatica in the past and it feels like it is doing it again with this acute injury.    Hypertension  This is a new problem. The current episode started 1 to 4 weeks ago. The problem has been waxing and waning since onset. Pertinent negatives include no chest pain, headaches, palpitations or shortness of breath. Risk factors for coronary artery disease include obesity, male gender and dyslipidemia. Past treatments include lifestyle changes, diuretics and angiotensin blockers. Current antihypertension treatment includes lifestyle changes, diuretics and angiotensin blockers. The current treatment provides moderate improvement. There are no compliance problems.  There is no history of angina, kidney disease or CAD/MI. There is no history of chronic renal disease or a thyroid problem.   Back Pain  This is a new problem. The current episode started in the past 7 days. The problem occurs intermittently. The problem has been waxing and waning since onset. The pain is present in the lumbar spine. The pain radiates to the right thigh, right knee and right foot. The pain is at a severity of 5/10. The pain is moderate. The pain is worse during the day. The symptoms are aggravated by bending, lying  down, position, standing and twisting. Associated symptoms include numbness (radiates to RLE). Pertinent negatives include no chest pain or headaches.        The following portions of the patient's history were reviewed and updated as appropriate: allergies, current medications, past family history, past medical history, past social history, past surgical history and problem list.    Review of Systems   Respiratory: Negative for shortness of breath.    Cardiovascular: Negative for chest pain and palpitations.   Musculoskeletal: Positive for back pain.   Neurological: Positive for numbness (radiates to RLE). Negative for headaches.     See history of Present Illness     Objective     Virtual Visit Physical Exam    PHQ-2/PHQ-9 Depression Screening 8/23/2022   Little Interest or Pleasure in Doing Things 0-->not at all   Feeling Down, Depressed or Hopeless 0-->not at all   PHQ-9: Brief Depression Severity Measure Score 0         Assessment & Plan     Problems Addressed this Visit        Cardiac and Vasculature    Primary hypertension - Primary     Hypertension is improving with treatment.  Continue current treatment regimen.  Dietary sodium restriction.  Weight loss.  Regular aerobic exercise.  Blood pressure will be reassessed at the next regular appointment.     The patient reports adjusting his medication at home where he was taking 2 of the 12.5 mg hydrochlorothiazide.  At this time I advised the patient that we will finish off the current medications that he has with his current dosing.  And I will adjust his medication further and have him take losartan 50 mg with 12.5 mg hydrochlorothiazide.  He was advised to continue monitoring blood pressure at home weekly.  We will see him back in the office for the follow-up visit with his new medication.  He is to call the office if he has any additional symptoms or unable to tolerate current dose.  Patient understanding counseled.         Relevant Medications     losartan-hydrochlorothiazide (Hyzaar) 50-12.5 MG per tablet   Other Visit Diagnoses     Medication dose changed        Musculoskeletal back pain        If symptoms worsen to please call us and schedule appointment.      Diagnoses       Codes Comments    Primary hypertension    -  Primary ICD-10-CM: I10  ICD-9-CM: 401.9 Continue blood pressure monitoring at home.    Medication dose changed     ICD-10-CM: Z79.899  ICD-9-CM: V58.69     Musculoskeletal back pain     ICD-10-CM: M54.9  ICD-9-CM: 724.5 If symptoms worsen to please call us and schedule appointment.          You have chosen to receive care through a telephone visit. Do you consent to use a telephone visit for your medical care today? Yes    This visit has been rescheduled as a phone visit to comply with patient safety concerns in accordance with CDC recommendations. Total time of discussion was 15 minutes.            This document has been electronically signed by Oseas Lam MD  December 14, 2022 10:29 EST    Part of this note may be an electronic transcription/translation of spoken language to printed text using the Dragon Dictation System.

## 2023-01-13 ENCOUNTER — OFFICE VISIT (OUTPATIENT)
Dept: FAMILY MEDICINE CLINIC | Facility: CLINIC | Age: 68
End: 2023-01-13
Payer: COMMERCIAL

## 2023-01-13 VITALS
SYSTOLIC BLOOD PRESSURE: 126 MMHG | HEART RATE: 75 BPM | DIASTOLIC BLOOD PRESSURE: 70 MMHG | BODY MASS INDEX: 34.06 KG/M2 | HEIGHT: 73 IN | TEMPERATURE: 96.6 F | OXYGEN SATURATION: 96 % | WEIGHT: 257 LBS

## 2023-01-13 DIAGNOSIS — Z79.899 MEDICATION DOSE INCREASED: ICD-10-CM

## 2023-01-13 DIAGNOSIS — I10 PRIMARY HYPERTENSION: Primary | ICD-10-CM

## 2023-01-13 PROCEDURE — 99213 OFFICE O/P EST LOW 20 MIN: CPT | Performed by: PSYCHOLOGIST

## 2023-01-13 RX ORDER — LOSARTAN POTASSIUM AND HYDROCHLOROTHIAZIDE 12.5; 1 MG/1; MG/1
1 TABLET ORAL
Qty: 30 TABLET | Refills: 2 | Status: SHIPPED | OUTPATIENT
Start: 2023-01-13 | End: 2023-02-16 | Stop reason: SDUPTHER

## 2023-01-13 RX ORDER — HYDROCHLOROTHIAZIDE 12.5 MG/1
TABLET ORAL
COMMUNITY
Start: 2022-12-31 | End: 2023-01-13 | Stop reason: SDUPTHER

## 2023-01-13 NOTE — PROGRESS NOTES
"Chief Complaint  Follow-up (BP meds adjusted, and back pain.)    Subjective        Barrie Rose presents to [facility] c/o follow up HTN:   Hypertension  This is a chronic problem. The current episode started more than 1 year ago. The problem has been waxing and waning since onset. The problem is uncontrolled. Pertinent negatives include no chest pain, headaches, palpitations or shortness of breath. Risk factors for coronary artery disease include obesity, male gender and dyslipidemia. Past treatments include lifestyle changes. Current antihypertension treatment includes lifestyle changes.       Objective   Vital Signs:  /70   Pulse 75   Temp 96.6 °F (35.9 °C)   Ht 185.4 cm (72.99\")   Wt 117 kg (257 lb)   SpO2 96%   BMI 33.91 kg/m²   Estimated body mass index is 33.91 kg/m² as calculated from the following:    Height as of this encounter: 185.4 cm (72.99\").    Weight as of this encounter: 117 kg (257 lb).    BMI is >= 30 and <35. (Class 1 Obesity). The following options were offered after discussion;: exercise counseling/recommendations and nutrition counseling/recommendations      Physical Exam  Vitals and nursing note reviewed.   Constitutional:       Appearance: Normal appearance. He is obese.   HENT:      Head: Normocephalic.      Right Ear: Tympanic membrane normal.      Left Ear: Tympanic membrane normal.      Nose: Nose normal.      Mouth/Throat:      Mouth: Mucous membranes are moist.   Eyes:      Extraocular Movements: Extraocular movements intact.      Pupils: Pupils are equal, round, and reactive to light.   Cardiovascular:      Rate and Rhythm: Normal rate and regular rhythm.      Pulses: Normal pulses.      Heart sounds: Normal heart sounds.   Pulmonary:      Effort: Pulmonary effort is normal.      Breath sounds: Normal breath sounds.   Abdominal:      General: Abdomen is protuberant. Bowel sounds are normal.      Palpations: Abdomen is soft. "   Musculoskeletal:         General: Normal range of motion.      Cervical back: Normal range of motion and neck supple.   Skin:     General: Skin is warm.      Capillary Refill: Capillary refill takes less than 2 seconds.   Neurological:      General: No focal deficit present.      Mental Status: He is alert and oriented to person, place, and time.   Psychiatric:         Mood and Affect: Mood normal.        Result Review :  The following data was reviewed by: Oseas Lam MD on 01/13/2023:  Common labs    Common Labs 8/24/22 8/24/22 8/24/22 8/24/22 8/24/22 8/24/22 11/29/22 11/29/22 11/29/22 11/29/22    0902 0902 0902 0902 0902 0902 1108 1108 1108 1108   Glucose    97     101 (A)    BUN    18     17    Creatinine    1.01     0.93    Sodium    140     139    Potassium    3.7     4.0    Chloride    101     102    Calcium    9.3     8.9    Albumin    4.16     4.09    Total Bilirubin    0.4     0.5    Alkaline Phosphatase    75     79    AST (SGOT)    30     33    ALT (SGPT)    31     51 (A)    WBC 8.42            Hemoglobin 16.2            Hematocrit 47.1            Platelets 200            Total Cholesterol   212 (A)       141   Triglycerides   194 (A)       137   HDL Cholesterol   46       50   LDL Cholesterol    131 (A)       67   Hemoglobin A1C     5.80 (A)  5.50      PSA      0.401       Uric Acid  7.1 (A)      6.8     (A) Abnormal value            Data reviewed: Radiologic studies 11/29/2022 DEXA    Assessment and Plan   Diagnoses and all orders for this visit:    1. Primary hypertension (Primary)    2. Medication dose increased    Other orders  -     losartan-hydrochlorothiazide (HYZAAR) 100-12.5 MG per tablet; Take 1 tablet by mouth Daily With Breakfast for 90 days.  Dispense: 30 tablet; Refill: 2      I spent 20 minutes caring for Barrie on this date of service. This time includes time spent by me in the following activities:reviewing tests, performing a medically appropriate examination and/or  evaluation , counseling and educating the patient/family/caregiver, ordering medications, tests, or procedures and documenting information in the medical record       Follow Up   Return in about 11 days (around 1/24/2023), or if symptoms worsen or fail to improve, for Recheck-- BP change in med .  Patient was given instructions and counseling regarding his condition or for health maintenance advice. Please see specific information pulled into the AVS if appropriate.           This document has been electronically signed by Oseas Lam MD  January 13, 2023 12:59 EST

## 2023-01-24 ENCOUNTER — OFFICE VISIT (OUTPATIENT)
Dept: FAMILY MEDICINE CLINIC | Facility: CLINIC | Age: 68
End: 2023-01-24
Payer: COMMERCIAL

## 2023-01-24 VITALS
HEART RATE: 73 BPM | OXYGEN SATURATION: 96 % | BODY MASS INDEX: 34.35 KG/M2 | WEIGHT: 259.2 LBS | TEMPERATURE: 98 F | SYSTOLIC BLOOD PRESSURE: 128 MMHG | HEIGHT: 73 IN | DIASTOLIC BLOOD PRESSURE: 84 MMHG

## 2023-01-24 DIAGNOSIS — I10 PRIMARY HYPERTENSION: Primary | ICD-10-CM

## 2023-01-24 PROCEDURE — 99213 OFFICE O/P EST LOW 20 MIN: CPT | Performed by: PSYCHOLOGIST

## 2023-01-24 NOTE — PROGRESS NOTES
"Chief Complaint  Hypertension    Subjective        Barrie Rose presents to [facility] C/O FOLLOWUP CHRONIC ILLNESS:   Hypertension  This is a chronic problem. The current episode started more than 1 year ago. The problem has been gradually improving since onset. The problem is controlled. Pertinent negatives include no chest pain, headaches, palpitations or shortness of breath. Risk factors for coronary artery disease include male gender, obesity and dyslipidemia. Past treatments include lifestyle changes, diuretics and angiotensin blockers. Current antihypertension treatment includes lifestyle changes, diuretics and angiotensin blockers. The current treatment provides moderate improvement. There are no compliance problems.  There is no history of angina, kidney disease or CAD/MI. There is no history of chronic renal disease or a thyroid problem.       Objective   Vital Signs:  /84 (BP Location: Right arm, Patient Position: Sitting, Cuff Size: Adult)   Pulse 73   Temp 98 °F (36.7 °C) (Temporal)   Ht 185.4 cm (72.99\")   Wt 118 kg (259 lb 3.2 oz)   SpO2 96%   BMI 34.20 kg/m²   Estimated body mass index is 34.2 kg/m² as calculated from the following:    Height as of this encounter: 185.4 cm (72.99\").    Weight as of this encounter: 118 kg (259 lb 3.2 oz).    BMI is >= 30 and <35. (Class 1 Obesity). The following options were offered after discussion;: exercise counseling/recommendations and nutrition counseling/recommendations      Physical Exam  Vitals and nursing note reviewed.   Constitutional:       Appearance: Normal appearance. He is obese.   HENT:      Head: Normocephalic.      Right Ear: Tympanic membrane normal.      Left Ear: Tympanic membrane normal.      Nose: Nose normal.      Mouth/Throat:      Mouth: Mucous membranes are moist.   Eyes:      Extraocular Movements: Extraocular movements intact.      Pupils: Pupils are equal, round, and reactive to light. "   Cardiovascular:      Rate and Rhythm: Normal rate and regular rhythm.      Pulses: Normal pulses.      Heart sounds: Normal heart sounds.   Pulmonary:      Effort: Pulmonary effort is normal.      Breath sounds: Normal breath sounds.   Abdominal:      General: Abdomen is protuberant. Bowel sounds are normal.      Palpations: Abdomen is soft.   Musculoskeletal:         General: Normal range of motion.      Cervical back: Normal range of motion and neck supple.   Skin:     General: Skin is warm.      Capillary Refill: Capillary refill takes less than 2 seconds.   Neurological:      General: No focal deficit present.      Mental Status: He is alert and oriented to person, place, and time.   Psychiatric:         Mood and Affect: Mood normal.        Result Review :  The following data was reviewed by: Oseas Lam MD on 01/24/2023:  Common labs    Common Labs 8/24/22 8/24/22 8/24/22 8/24/22 8/24/22 8/24/22 11/29/22 11/29/22 11/29/22 11/29/22    0902 0902 0902 0902 0902 0902 1108 1108 1108 1108   Glucose    97     101 (A)    BUN    18     17    Creatinine    1.01     0.93    Sodium    140     139    Potassium    3.7     4.0    Chloride    101     102    Calcium    9.3     8.9    Albumin    4.16     4.09    Total Bilirubin    0.4     0.5    Alkaline Phosphatase    75     79    AST (SGOT)    30     33    ALT (SGPT)    31     51 (A)    WBC 8.42            Hemoglobin 16.2            Hematocrit 47.1            Platelets 200            Total Cholesterol   212 (A)       141   Triglycerides   194 (A)       137   HDL Cholesterol   46       50   LDL Cholesterol    131 (A)       67   Hemoglobin A1C     5.80 (A)  5.50      PSA      0.401       Uric Acid  7.1 (A)      6.8     (A) Abnormal value              Assessment and Plan   Diagnoses and all orders for this visit:    1. Primary hypertension (Primary)  Comments:  HE IS DOING WELL WITH THE ADJUSTED MEDICATION / BP MONITORED AT HOME   -112/ 90/70  Assessment &  Plan:  Hypertension is improving with treatment.  Continue current treatment regimen.  Dietary sodium restriction.  Weight loss.  Regular aerobic exercise.  Blood pressure will be reassessed at the next regular appointment.      I spent 20 minutes caring for Barrie on this date of service. This time includes time spent by me in the following activities:reviewing tests, performing a medically appropriate examination and/or evaluation , counseling and educating the patient/family/caregiver, ordering medications, tests, or procedures and documenting information in the medical record       Follow Up   Return if symptoms worsen or fail to improve/ RTC, for ALREADY HAS A FOLLOW UP APPT 2/16.  Patient was given instructions and counseling regarding his condition or for health maintenance advice. Please see specific information pulled into the AVS if appropriate.           This document has been electronically signed by Oseas Lam MD  January 24, 2023 14:10 EST

## 2023-02-03 RX ORDER — ATORVASTATIN CALCIUM 20 MG/1
TABLET, FILM COATED ORAL
Qty: 30 TABLET | Refills: 2 | Status: SHIPPED | OUTPATIENT
Start: 2023-02-03 | End: 2023-03-06 | Stop reason: SDUPTHER

## 2023-02-03 RX ORDER — LISINOPRIL AND HYDROCHLOROTHIAZIDE 20; 12.5 MG/1; MG/1
TABLET ORAL
Qty: 30 TABLET | Refills: 2 | OUTPATIENT
Start: 2023-02-03

## 2023-02-16 ENCOUNTER — OFFICE VISIT (OUTPATIENT)
Dept: FAMILY MEDICINE CLINIC | Facility: CLINIC | Age: 68
End: 2023-02-16
Payer: COMMERCIAL

## 2023-02-16 VITALS
HEART RATE: 87 BPM | RESPIRATION RATE: 18 BRPM | HEIGHT: 73 IN | OXYGEN SATURATION: 96 % | SYSTOLIC BLOOD PRESSURE: 132 MMHG | TEMPERATURE: 97.3 F | WEIGHT: 240.6 LBS | DIASTOLIC BLOOD PRESSURE: 84 MMHG | BODY MASS INDEX: 31.89 KG/M2

## 2023-02-16 DIAGNOSIS — M54.40 ACUTE RIGHT-SIDED LOW BACK PAIN WITH SCIATICA, SCIATICA LATERALITY UNSPECIFIED: ICD-10-CM

## 2023-02-16 DIAGNOSIS — I10 PRIMARY HYPERTENSION: Primary | ICD-10-CM

## 2023-02-16 DIAGNOSIS — M10.09 ACUTE IDIOPATHIC GOUT OF MULTIPLE SITES: ICD-10-CM

## 2023-02-16 PROCEDURE — 96372 THER/PROPH/DIAG INJ SC/IM: CPT | Performed by: PSYCHOLOGIST

## 2023-02-16 PROCEDURE — 99213 OFFICE O/P EST LOW 20 MIN: CPT | Performed by: PSYCHOLOGIST

## 2023-02-16 RX ORDER — KETOROLAC TROMETHAMINE 30 MG/ML
60 INJECTION, SOLUTION INTRAMUSCULAR; INTRAVENOUS ONCE
Status: COMPLETED | OUTPATIENT
Start: 2023-02-16 | End: 2023-02-16

## 2023-02-16 RX ORDER — METHOCARBAMOL 750 MG/1
TABLET, FILM COATED ORAL
COMMUNITY
Start: 2023-02-12

## 2023-02-16 RX ORDER — INDOMETHACIN 50 MG/1
CAPSULE ORAL
Qty: 30 CAPSULE | Refills: 0 | Status: SHIPPED | OUTPATIENT
Start: 2023-02-16

## 2023-02-16 RX ORDER — LOSARTAN POTASSIUM AND HYDROCHLOROTHIAZIDE 12.5; 1 MG/1; MG/1
1 TABLET ORAL
Qty: 30 TABLET | Refills: 2 | Status: SHIPPED | OUTPATIENT
Start: 2023-02-16 | End: 2023-05-17

## 2023-02-16 RX ADMIN — KETOROLAC TROMETHAMINE 60 MG: 30 INJECTION, SOLUTION INTRAMUSCULAR; INTRAVENOUS at 10:36

## 2023-02-16 NOTE — PROGRESS NOTES
Chief Complaint  Follow-up (Pt is f/u for HTN)    Subjective        Barrie Rose presents to [facility]   C/o follow up hypertension 2. Back pain / sciatica 3. Gout flare up / his right big toe hurting   Hypertension  This is a chronic problem. The current episode started more than 1 year ago. The problem has been resolved since onset. The problem is controlled. Pertinent negatives include no chest pain, headaches, palpitations or shortness of breath. Risk factors for coronary artery disease include male gender, obesity and dyslipidemia. Past treatments include lifestyle changes and ACE inhibitors. Current antihypertension treatment includes lifestyle changes, diuretics and ACE inhibitors. There are no compliance problems.  There is no history of angina, kidney disease or CAD/MI. There is no history of chronic renal disease or a thyroid problem.   Arthritis  Presents for follow-up visit. He complains of pain, joint swelling and joint warmth. The symptoms have been improving. Affected locations include the right toes. His pain is at a severity of 7/10. Associated symptoms include pain at night. Pertinent negatives include no fever. Compliance with total regimen is %. Compliance with medications is %.   Back Pain  This is a recurrent problem. The current episode started 1 to 4 weeks ago. The problem occurs constantly. The problem has been gradually worsening since onset. The pain is present in the lumbar spine. The pain is at a severity of 8/10. The pain is moderate. The pain is the same all the time. The symptoms are aggravated by bending, lying down, sitting and standing. Pertinent negatives include no chest pain, fever, headaches or numbness. He has tried chiropractic manipulation and bed rest for the symptoms. The treatment provided mild relief.       Objective   Vital Signs:  /84 (BP Location: Left arm, Patient Position: Sitting, Cuff Size: Adult)   Pulse 87    "Temp 97.3 °F (36.3 °C) (Temporal)   Resp 18   Ht 185.4 cm (73\")   Wt 109 kg (240 lb 9.6 oz)   SpO2 96%   BMI 31.74 kg/m²   Estimated body mass index is 31.74 kg/m² as calculated from the following:    Height as of this encounter: 185.4 cm (73\").    Weight as of this encounter: 109 kg (240 lb 9.6 oz).    BMI is >= 30 and <35. (Class 1 Obesity). The following options were offered after discussion;: exercise counseling/recommendations and nutrition counseling/recommendations      Physical Exam  Vitals and nursing note reviewed.   Constitutional:       Appearance: Normal appearance. He is obese.   HENT:      Head: Normocephalic.      Right Ear: Tympanic membrane normal.      Left Ear: Tympanic membrane normal.      Nose: Nose normal.      Mouth/Throat:      Mouth: Mucous membranes are moist.   Eyes:      Extraocular Movements: Extraocular movements intact.      Pupils: Pupils are equal, round, and reactive to light.   Cardiovascular:      Rate and Rhythm: Normal rate and regular rhythm.      Pulses: Normal pulses.      Heart sounds: Normal heart sounds.   Pulmonary:      Effort: Pulmonary effort is normal.      Breath sounds: Normal breath sounds.   Abdominal:      General: Abdomen is protuberant. Bowel sounds are normal.      Palpations: Abdomen is soft.   Musculoskeletal:         General: Normal range of motion.      Cervical back: Normal range of motion and neck supple.   Feet:      Comments: Right great toe swollen and red / and medially erythema  Skin:     General: Skin is warm.      Capillary Refill: Capillary refill takes less than 2 seconds.   Neurological:      General: No focal deficit present.      Mental Status: He is alert and oriented to person, place, and time.   Psychiatric:         Mood and Affect: Mood normal.        Result Review :  The following data was reviewed by: Oseas Lam MD on 02/16/2023:  Common labs    Common Labs 8/24/22 8/24/22 8/24/22 8/24/22 8/24/22 8/24/22 11/29/22 " 11/29/22 11/29/22 11/29/22    0902 0902 0902 0902 0902 0902 1108 1108 1108 1108   Glucose    97     101 (A)    BUN    18     17    Creatinine    1.01     0.93    Sodium    140     139    Potassium    3.7     4.0    Chloride    101     102    Calcium    9.3     8.9    Albumin    4.16     4.09    Total Bilirubin    0.4     0.5    Alkaline Phosphatase    75     79    AST (SGOT)    30     33    ALT (SGPT)    31     51 (A)    WBC 8.42            Hemoglobin 16.2            Hematocrit 47.1            Platelets 200            Total Cholesterol   212 (A)       141   Triglycerides   194 (A)       137   HDL Cholesterol   46       50   LDL Cholesterol    131 (A)       67   Hemoglobin A1C     5.80 (A)  5.50      PSA      0.401       Uric Acid  7.1 (A)      6.8     (A) Abnormal value            Data reviewed: Radiologic studies 11/29/2022 DEXA          Assessment and Plan   Diagnoses and all orders for this visit:    1. Primary hypertension (Primary)  Comments:  BP MONITORED AT HOME 140-120/ 93-75  Assessment & Plan:  Hypertension is improving with treatment.  Continue current treatment regimen.  Dietary sodium restriction.  Weight loss.  Regular aerobic exercise.  Blood pressure will be reassessed at the next regular appointment.      2. Acute idiopathic gout of multiple sites    3. Acute right-sided low back pain with sciatica, sciatica laterality unspecified  -     ketorolac (TORADOL) injection 60 mg    Other orders  -     losartan-hydrochlorothiazide (HYZAAR) 100-12.5 MG per tablet; Take 1 tablet by mouth Daily With Breakfast for 90 days.  Dispense: 30 tablet; Refill: 2  -     indomethacin (INDOCIN) 50 MG capsule; Take 1 tablet tid x 4 days then 1 tab bid x 3 days then 1 tab daily x 7days with food Start tomorrow 2/17/23  Dispense: 30 capsule; Refill: 0         I spent 20 minutes caring for Barrie on this date of service. This time includes time spent by me in the following activities:reviewing tests, performing a medically  appropriate examination and/or evaluation , counseling and educating the patient/family/caregiver, ordering medications, tests, or procedures and documenting information in the medical record       Follow Up   Return in about 3 months (around 5/16/2023) for Recheck, RTC FASTING LABS ( chronic illness / med refill) .  Patient was given instructions and counseling regarding his condition or for health maintenance advice. Please see specific information pulled into the AVS if appropriate.           This document has been electronically signed by Oseas Lam MD  February 16, 2023 10:30 EST

## 2023-03-06 RX ORDER — MELOXICAM 7.5 MG/1
TABLET ORAL
Qty: 60 TABLET | Refills: 0 | Status: SHIPPED | OUTPATIENT
Start: 2023-03-06

## 2023-03-06 RX ORDER — ATORVASTATIN CALCIUM 20 MG/1
20 TABLET, FILM COATED ORAL NIGHTLY
Qty: 30 TABLET | Refills: 2 | Status: SHIPPED | OUTPATIENT
Start: 2023-03-06

## 2023-03-06 NOTE — TELEPHONE ENCOUNTER
Rx Refill Note  Requested Prescriptions     Pending Prescriptions Disp Refills   • meloxicam (MOBIC) 7.5 MG tablet [Pharmacy Med Name: MELOXICAM 7.5 MG TABLET] 60 tablet 0     Sig: TAKE 1 TABLET BY MOUTH DAILY WITH LUNCH   • atorvastatin (LIPITOR) 20 MG tablet 30 tablet 2     Sig: Take 1 tablet by mouth Every Night.      Last office visit with prescribing clinician: 2/16/2023   Last telemedicine visit with prescribing clinician: 5/16/2023   Next office visit with prescribing clinician: 5/16/2023                         Would you like a call back once the refill request has been completed: [] Yes [] No    If the office needs to give you a call back, can they leave a voicemail: [] Yes [] No    Kisha Fry RN  03/06/23, 11:32 EST

## 2023-04-17 RX ORDER — LOSARTAN POTASSIUM AND HYDROCHLOROTHIAZIDE 12.5; 1 MG/1; MG/1
TABLET ORAL
Qty: 90 TABLET | Refills: 0 | Status: SHIPPED | OUTPATIENT
Start: 2023-04-17

## 2023-04-17 NOTE — TELEPHONE ENCOUNTER
Caller: Barrie Rose    Relationship: Self    Best call back number: [phone]    Requested Prescriptions:   Requested Prescriptions     Pending Prescriptions Disp Refills   • losartan-hydrochlorothiazide (HYZAAR) 100-12.5 MG per tablet [Pharmacy Med Name: LOSARTAN-HCTZ 100-12.5 MG TAB] 90 tablet      Sig: TAKE 1 TABLET BY MOUTH DAILY WITH BREAKFAST        Pharmacy where request should be sent: [facility] [id number] [place] [id number] - [phone] [facility] [phone]      Last office visit with prescribing clinician: 2/16/2023   Last telemedicine visit with prescribing clinician: 5/16/2023   Next office visit with prescribing clinician: 5/16/2023     Additional details provided by patient: COMPLETELY OUT OF MEDICATIONS    Does the patient have less than a 3 day supply:  [x] Yes  [] No    Would you like a call back once the refill request has been completed: [x] Yes [] No    If the office needs to give you a call back, can they leave a voicemail: [x] Yes [] No    Ruben Gibson Rep   04/17/23 11:55 EDT

## 2023-05-16 ENCOUNTER — TELEPHONE (OUTPATIENT)
Dept: FAMILY MEDICINE CLINIC | Facility: CLINIC | Age: 68
End: 2023-05-16

## 2023-05-16 ENCOUNTER — OFFICE VISIT (OUTPATIENT)
Dept: FAMILY MEDICINE CLINIC | Facility: CLINIC | Age: 68
End: 2023-05-16
Payer: COMMERCIAL

## 2023-05-16 VITALS
WEIGHT: 255.4 LBS | TEMPERATURE: 96.9 F | HEIGHT: 73 IN | DIASTOLIC BLOOD PRESSURE: 78 MMHG | OXYGEN SATURATION: 97 % | RESPIRATION RATE: 18 BRPM | HEART RATE: 71 BPM | SYSTOLIC BLOOD PRESSURE: 112 MMHG | BODY MASS INDEX: 33.85 KG/M2

## 2023-05-16 DIAGNOSIS — E78.2 MIXED HYPERLIPIDEMIA: Primary | ICD-10-CM

## 2023-05-16 DIAGNOSIS — M10.09 ACUTE IDIOPATHIC GOUT OF MULTIPLE SITES: ICD-10-CM

## 2023-05-16 DIAGNOSIS — I10 PRIMARY HYPERTENSION: ICD-10-CM

## 2023-05-16 DIAGNOSIS — Z91.81 AT LOW RISK FOR FALL: ICD-10-CM

## 2023-05-16 DIAGNOSIS — Z12.11 ENCOUNTER FOR SCREENING COLONOSCOPY: ICD-10-CM

## 2023-05-16 LAB
ALBUMIN SERPL-MCNC: 4.3 G/DL (ref 3.5–5.2)
ALBUMIN/GLOB SERPL: 1.3 G/DL
ALP SERPL-CCNC: 84 U/L (ref 39–117)
ALT SERPL W P-5'-P-CCNC: 29 U/L (ref 1–41)
ANION GAP SERPL CALCULATED.3IONS-SCNC: 7.5 MMOL/L (ref 5–15)
AST SERPL-CCNC: 25 U/L (ref 1–40)
BILIRUB SERPL-MCNC: 0.7 MG/DL (ref 0–1.2)
BUN SERPL-MCNC: 21 MG/DL (ref 8–23)
BUN/CREAT SERPL: 19.4 (ref 7–25)
CALCIUM SPEC-SCNC: 9.5 MG/DL (ref 8.6–10.5)
CHLORIDE SERPL-SCNC: 104 MMOL/L (ref 98–107)
CHOLEST SERPL-MCNC: 136 MG/DL (ref 0–200)
CO2 SERPL-SCNC: 30.5 MMOL/L (ref 22–29)
CREAT SERPL-MCNC: 1.08 MG/DL (ref 0.76–1.27)
EGFRCR SERPLBLD CKD-EPI 2021: 74.7 ML/MIN/1.73
GLOBULIN UR ELPH-MCNC: 3.2 GM/DL
GLUCOSE SERPL-MCNC: 105 MG/DL (ref 65–99)
HDLC SERPL-MCNC: 55 MG/DL (ref 40–60)
LDLC SERPL CALC-MCNC: 57 MG/DL (ref 0–100)
LDLC/HDLC SERPL: 0.96 {RATIO}
POTASSIUM SERPL-SCNC: 4.1 MMOL/L (ref 3.5–5.2)
PROT SERPL-MCNC: 7.5 G/DL (ref 6–8.5)
SODIUM SERPL-SCNC: 142 MMOL/L (ref 136–145)
TRIGL SERPL-MCNC: 140 MG/DL (ref 0–150)
VLDLC SERPL-MCNC: 24 MG/DL (ref 5–40)

## 2023-05-16 PROCEDURE — 99214 OFFICE O/P EST MOD 30 MIN: CPT | Performed by: PSYCHOLOGIST

## 2023-05-16 PROCEDURE — 80061 LIPID PANEL: CPT | Performed by: PSYCHOLOGIST

## 2023-05-16 PROCEDURE — 80053 COMPREHEN METABOLIC PANEL: CPT | Performed by: PSYCHOLOGIST

## 2023-05-16 RX ORDER — ALLOPURINOL 100 MG/1
100 TABLET ORAL
Qty: 30 TABLET | Refills: 5 | Status: SHIPPED | OUTPATIENT
Start: 2023-05-16 | End: 2023-05-16 | Stop reason: SDUPTHER

## 2023-05-16 RX ORDER — MELOXICAM 7.5 MG/1
7.5 TABLET ORAL
Qty: 30 TABLET | Refills: 5 | Status: SHIPPED | OUTPATIENT
Start: 2023-05-16 | End: 2023-11-12

## 2023-05-16 RX ORDER — MELOXICAM 7.5 MG/1
7.5 TABLET ORAL
Qty: 30 TABLET | Refills: 5 | Status: SHIPPED | OUTPATIENT
Start: 2023-05-16 | End: 2023-05-16 | Stop reason: SDUPTHER

## 2023-05-16 RX ORDER — ALLOPURINOL 100 MG/1
100 TABLET ORAL
Qty: 30 TABLET | Refills: 5 | Status: SHIPPED | OUTPATIENT
Start: 2023-05-16 | End: 2023-11-12

## 2023-05-16 RX ORDER — CYCLOBENZAPRINE HCL 5 MG
1 TABLET ORAL AS NEEDED
COMMUNITY
Start: 2023-02-21 | End: 2023-05-16

## 2023-05-16 RX ORDER — ATORVASTATIN CALCIUM 20 MG/1
20 TABLET, FILM COATED ORAL NIGHTLY
Qty: 30 TABLET | Refills: 5 | Status: SHIPPED | OUTPATIENT
Start: 2023-05-16 | End: 2023-05-16 | Stop reason: SDUPTHER

## 2023-05-16 RX ORDER — CELECOXIB 200 MG/1
2 CAPSULE ORAL DAILY
COMMUNITY
Start: 2023-02-21

## 2023-05-16 RX ORDER — METHOCARBAMOL 750 MG/1
750 TABLET, FILM COATED ORAL 3 TIMES DAILY
Qty: 30 TABLET | Refills: 0 | Status: SHIPPED | OUTPATIENT
Start: 2023-05-16 | End: 2023-05-26

## 2023-05-16 RX ORDER — METHOCARBAMOL 750 MG/1
750 TABLET, FILM COATED ORAL 3 TIMES DAILY
Qty: 30 TABLET | Refills: 0 | Status: SHIPPED | OUTPATIENT
Start: 2023-05-16 | End: 2023-05-16 | Stop reason: SDUPTHER

## 2023-05-16 RX ORDER — ATORVASTATIN CALCIUM 20 MG/1
20 TABLET, FILM COATED ORAL NIGHTLY
Qty: 30 TABLET | Refills: 5 | Status: SHIPPED | OUTPATIENT
Start: 2023-05-16 | End: 2023-11-12

## 2023-05-16 NOTE — TELEPHONE ENCOUNTER
Caller: MiltonBarrie [email or web address]    Relationship: Self    Best call back number:  [phone]    Requested Prescriptions:   Requested Prescriptions      No prescriptions requested or ordered in this encounter      allopurinol (Zyloprim) 100 MG tablet    atorvastatin (LIPITOR) 20 MG tablet    meloxicam (MOBIC) 7.5 MG tablet    methocarbamol (ROBAXIN) 750 MG tablet    Pharmacy where request should be sent:      [facility] [id number] [place] [id number] - [phone] [facility] [phone] FX       Last office visit with prescribing clinician: 5/16/2023   Last telemedicine visit with prescribing clinician: 5/16/2023   Next office visit with prescribing clinician: 8/16/2023     Additional details provided by patient:     MEDICATIONS WERE CALLED IN TO A [facility] IN [place].  PATIENT NOT SURE WHY.  PLEASE SEND THEM TO [facility] IN [place]     LIZETH Li   05/16/23 15:58 EDT

## 2023-05-16 NOTE — PROGRESS NOTES
"Chief Complaint  Follow-up (Cont. Care of HTN, Gout. Pt is fasting for labs. Needs rf of allopurinol.)    Subjective        Barrie Rose presents to [facility]   C/O follow up chronic illness 2/ Fasting labs   Hypertension  This is a chronic problem. The current episode started more than 1 year ago. The problem has been resolved since onset. The problem is uncontrolled. Pertinent negatives include no chest pain, headaches, palpitations or shortness of breath. Risk factors for coronary artery disease include obesity, male gender and dyslipidemia. Past treatments include lifestyle changes, diuretics and angiotensin blockers. Current antihypertension treatment includes lifestyle changes, diuretics and angiotensin blockers. The current treatment provides moderate improvement. There are no compliance problems.  There is no history of angina, kidney disease or CAD/MI. There is no history of chronic renal disease or a thyroid problem.   Hyperlipidemia  This is a chronic problem. The current episode started more than 1 year ago. The problem is controlled. Recent lipid tests were reviewed and are normal. Exacerbating diseases include obesity. He has no history of chronic renal disease. Pertinent negatives include no chest pain, leg pain or shortness of breath. Current antihyperlipidemic treatment includes statins. The current treatment provides moderate improvement of lipids. There are no compliance problems.  Risk factors for coronary artery disease include obesity and hypertension.   Arthritis  Presents for follow-up visit. The symptoms have been improving. Compliance with total regimen is %. Compliance with medications is %.       Objective   Vital Signs:  /78 (BP Location: Left arm, Patient Position: Sitting, Cuff Size: Adult)   Pulse 71   Temp 96.9 °F (36.1 °C) (Temporal)   Resp 18   Ht 185.4 cm (73\")   Wt 116 kg (255 lb 6.4 oz)   SpO2 97%   BMI 33.70 kg/m² " "  Estimated body mass index is 33.7 kg/m² as calculated from the following:    Height as of this encounter: 185.4 cm (73\").    Weight as of this encounter: 116 kg (255 lb 6.4 oz).    BMI is >= 30 and <35. (Class 1 Obesity). The following options were offered after discussion;: exercise counseling/recommendations and nutrition counseling/recommendations      Physical Exam  Vitals and nursing note reviewed.   Constitutional:       Appearance: Normal appearance. He is obese.   HENT:      Head: Normocephalic.      Right Ear: Tympanic membrane normal.      Left Ear: Tympanic membrane normal.      Nose: Nose normal.      Mouth/Throat:      Mouth: Mucous membranes are moist.   Eyes:      Extraocular Movements: Extraocular movements intact.      Pupils: Pupils are equal, round, and reactive to light.   Cardiovascular:      Rate and Rhythm: Normal rate and regular rhythm.      Pulses: Normal pulses.      Heart sounds: Normal heart sounds.   Pulmonary:      Effort: Pulmonary effort is normal.      Breath sounds: Normal breath sounds.   Abdominal:      General: Abdomen is protuberant. Bowel sounds are normal.      Palpations: Abdomen is soft.   Musculoskeletal:         General: Normal range of motion.      Cervical back: Normal range of motion and neck supple.   Skin:     General: Skin is warm.      Capillary Refill: Capillary refill takes less than 2 seconds.   Neurological:      General: No focal deficit present.      Mental Status: He is alert and oriented to person, place, and time.   Psychiatric:         Mood and Affect: Mood normal.        Result Review :  The following data was reviewed by: Oseas Lam MD on 05/16/2023:  Common labs        8/24/2022    09:02 11/29/2022    11:08   Common Labs   Glucose 97   101     BUN 18   17     Creatinine 1.01   0.93     Sodium 140   139     Potassium 3.7   4.0     Chloride 101   102     Calcium 9.3   8.9     Albumin 4.16   4.09     Total Bilirubin 0.4   0.5     Alkaline " Phosphatase 75   79     AST (SGOT) 30   33     ALT (SGPT) 31   51     WBC 8.42      Hemoglobin 16.2      Hematocrit 47.1      Platelets 200      Total Cholesterol 212   141     Triglycerides 194   137     HDL Cholesterol 46   50     LDL Cholesterol  131   67     Hemoglobin A1C 5.80   5.50     PSA 0.401      Uric Acid 7.1   6.8       Data reviewed: Radiologic studies 11/29/22 DEXA          Assessment and Plan   Diagnoses and all orders for this visit:    1. Mixed hyperlipidemia (Primary)  Assessment & Plan:  Lipid abnormalities are improving with treatment.  Nutritional counseling was provided. and Pharmacotherapy as ordered.  Lipids will be reassessed in 3 months.      2. Primary hypertension  Assessment & Plan:  Hypertension is improving with treatment.  Continue current treatment regimen.  Dietary sodium restriction.  Weight loss.  Regular aerobic exercise.  Blood pressure will be reassessed at the next regular appointment.      3. Acute idiopathic gout of multiple sites    4. At low risk for fall    5. Encounter for screening colonoscopy        I spent 20 minutes caring for Barrie on this date of service. This time includes time spent by me in the following activities:reviewing tests, performing a medically appropriate examination and/or evaluation , counseling and educating the patient/family/caregiver, ordering medications, tests, or procedures and documenting information in the medical record     Follow Up   Return in about 3 months (around 8/30/2023) for Annual physical, RTC FASTING LABS.  Patient was given instructions and counseling regarding his condition or for health maintenance advice. Please see specific information pulled into the AVS if appropriate.           This document has been electronically signed by Oseas Lam MD  May 16, 2023 11:06 EDT

## 2023-05-16 NOTE — PATIENT INSTRUCTIONS
Fall Prevention in the Home, Adult  Falls can cause injuries and affect people of all ages. There are many simple things that you can do to make your home safe and to help prevent falls. Ask for help when making these changes, if needed.  What actions can I take to prevent falls?  General instructions  • Use good lighting in all rooms. Replace any light bulbs that burn out, turn on lights if it is dark, and use night-lights.  • Place frequently used items in easy-to-reach places. Lower the shelves around your home if necessary.  • Set up furniture so that there are clear paths around it. Avoid moving your furniture around.  • Remove throw rugs and other tripping hazards from the floor.  • Avoid walking on wet floors.  • Fix any uneven floor surfaces.  • Add color or contrast paint or tape to grab bars and handrails in your home. Place contrasting color strips on the first and last steps of staircases.  • When you use a stepladder, make sure that it is completely opened and that the sides and supports are firmly locked. Have someone hold the ladder while you are using it. Do not climb a closed stepladder.  • Know where your pets are when moving through your home.  What can I do in the bathroom?     • Keep the floor dry. Immediately clean up any water that is on the floor.  • Remove soap buildup in the tub or shower regularly.  • Use nonskid mats or decals on the floor of the tub or shower.  • Attach bath mats securely with double-sided, nonslip rug tape.  • If you need to sit down while you are in the shower, use a plastic, nonslip stool.  • Install grab bars by the toilet and in the tub and shower. Do not use towel bars as grab bars.  What can I do in the bedroom?  • Make sure that a bedside light is easy to reach.  • Do not use oversized bedding that reaches the floor.  • Have a firm chair that has side arms to use for getting dressed.  What can I do in the kitchen?  • Clean up any spills right away.  • If you  need to reach for something above you, use a sturdy step stool that has a grab bar.  • Keep electrical cables out of the way.  • Do not use floor polish or wax that makes floors slippery. If you must use wax, make sure that it is non-skid floor wax.  What can I do with my stairs?  • Do not leave any items on the stairs.  • Make sure that you have a light switch at the top and the bottom of the stairs. Have them installed if you do not have them.  • Make sure that there are handrails on both sides of the stairs. Fix handrails that are broken or loose. Make sure that handrails are as long as the staircases.  • Install non-slip stair treads on all stairs in your home.  • Avoid having throw rugs at the top or bottom of stairs, or secure the rugs with carpet tape to prevent them from moving.  • Choose a carpet design that does not hide the edge of steps on the stairs.  • Check any carpeting to make sure that it is firmly attached to the stairs. Fix any carpet that is loose or worn.  What can I do on the outside of my home?  • Use bright outdoor lighting.  • Regularly repair the edges of walkways and driveways and fix any cracks.  • Remove high doorway thresholds.  • Trim any shrubbery on the main path into your home.  • Regularly check that handrails are securely fastened and in good repair. Both sides of all steps should have handrails.  • Install guardrails along the edges of any raised decks or porches.  • Clear walkways of debris and clutter, including tools and rocks.  • Have leaves, snow, and ice cleared regularly.  • Use sand or salt on walkways during winter months.  • In the garage, clean up any spills right away, including grease or oil spills.  What other actions can I take?  • Wear closed-toe shoes that fit well and support your feet. Wear shoes that have rubber soles or low heels.  • Use mobility aids as needed, such as canes, walkers, scooters, and crutches.  • Review your medicines with your health care  provider. Some medicines can cause dizziness or changes in blood pressure, which increase your risk of falling.  Talk with your health care provider about other ways that you can decrease your risk of falls. This may include working with a physical therapist or  to improve your strength, balance, and endurance.  Where to find more information  • Centers for Disease Control and Prevention, [place]: www.cdc.gov  • National [place] on Aging: www.ryne.nih.gov  Contact a health care provider if:  • You are afraid of falling at home.  • You feel weak, drowsy, or dizzy at home.  • You fall at home.  Summary  • There are many simple things that you can do to make your home safe and to help prevent falls.  • Ways to make your home safe include removing tripping hazards and installing grab bars in the bathroom.  • Ask for help when making these changes in your home.  This information is not intended to replace advice given to you by your health care provider. Make sure you discuss any questions you have with your health care provider.  Document Revised: 09/19/2022 Document Reviewed: 07/21/2021  Elsevier Patient Education © 2022 [employer]

## 2023-05-16 NOTE — TELEPHONE ENCOUNTER
Medications were sent to wrong pharmacy by mistake. Resent what was sent at today's visit to correct pharmacy.

## 2023-08-15 RX ORDER — LOSARTAN POTASSIUM AND HYDROCHLOROTHIAZIDE 12.5; 1 MG/1; MG/1
TABLET ORAL
Qty: 90 TABLET | Refills: 0 | Status: SHIPPED | OUTPATIENT
Start: 2023-08-15

## 2023-08-16 ENCOUNTER — OFFICE VISIT (OUTPATIENT)
Dept: FAMILY MEDICINE CLINIC | Facility: CLINIC | Age: 68
End: 2023-08-16
Payer: COMMERCIAL

## 2023-08-16 VITALS
BODY MASS INDEX: 33.4 KG/M2 | WEIGHT: 252 LBS | RESPIRATION RATE: 18 BRPM | OXYGEN SATURATION: 97 % | HEIGHT: 73 IN | SYSTOLIC BLOOD PRESSURE: 122 MMHG | DIASTOLIC BLOOD PRESSURE: 92 MMHG | HEART RATE: 74 BPM

## 2023-08-16 DIAGNOSIS — Z12.5 SCREENING FOR PROSTATE CANCER: ICD-10-CM

## 2023-08-16 DIAGNOSIS — Z00.00 ENCOUNTER FOR ANNUAL PHYSICAL EXAM: Primary | ICD-10-CM

## 2023-08-16 DIAGNOSIS — E78.2 MIXED HYPERLIPIDEMIA: ICD-10-CM

## 2023-08-16 DIAGNOSIS — I10 PRIMARY HYPERTENSION: ICD-10-CM

## 2023-08-16 DIAGNOSIS — M10.09 ACUTE IDIOPATHIC GOUT OF MULTIPLE SITES: ICD-10-CM

## 2023-08-16 LAB
BILIRUB BLD-MCNC: NEGATIVE MG/DL
CLARITY, POC: CLEAR
COLOR UR: ABNORMAL
GLUCOSE UR STRIP-MCNC: NEGATIVE MG/DL
KETONES UR QL: NEGATIVE
LEUKOCYTE EST, POC: NEGATIVE
NITRITE UR-MCNC: NEGATIVE MG/ML
PH UR: 6 [PH] (ref 5–8)
PROT UR STRIP-MCNC: NEGATIVE MG/DL
RBC # UR STRIP: ABNORMAL /UL
SP GR UR: 1.03 (ref 1–1.03)
UROBILINOGEN UR QL: NORMAL

## 2023-08-16 NOTE — PROGRESS NOTES
"Chief Complaint  Annual Exam (Annual Physical./)    Subjective        Barrie Rose presents to [facility]  C/o 1. Annual physical 2. Fasting labs 3. Chronic illness   Hyperlipidemia  This is a chronic problem. The current episode started more than 1 year ago. The problem is controlled. Recent lipid tests were reviewed and are normal. He has no history of chronic renal disease, diabetes or obesity. Pertinent negatives include no chest pain, focal sensory loss, leg pain or shortness of breath. Current antihyperlipidemic treatment includes statins. The current treatment provides moderate improvement of lipids. There are no compliance problems.  Risk factors for coronary artery disease include obesity, male sex and hypertension.   Hypertension  This is a chronic problem. The current episode started more than 1 year ago. The problem has been waxing and waning since onset. The problem is uncontrolled. Pertinent negatives include no chest pain, headaches, palpitations or shortness of breath. Risk factors for coronary artery disease include obesity, smoking/tobacco exposure and dyslipidemia. Past treatments include lifestyle changes, diuretics and angiotensin blockers. Current antihypertension treatment includes lifestyle changes, diuretics and angiotensin blockers. The current treatment provides moderate improvement. There is no history of angina, kidney disease or CAD/MI. There is no history of chronic renal disease or a thyroid problem.   Arthritis  Presents for follow-up visit. He reports no pain, stiffness, joint swelling or joint warmth. The symptoms have been improving. Pertinent negatives include no pain at night. Compliance with total regimen is %. Compliance with medications is %.     Objective   Vital Signs:  /92   Pulse 74   Resp 18   Ht 185.4 cm (73\")   Wt 114 kg (252 lb)   SpO2 97%   BMI 33.25 kg/mý   Estimated body mass index is 33.25 kg/mý as " "calculated from the following:    Height as of this encounter: 185.4 cm (73\").    Weight as of this encounter: 114 kg (252 lb).        Physical Exam  Vitals and nursing note reviewed. Exam conducted with a chaperone present.   Constitutional:       General: He is awake.      Appearance: Normal appearance. He is well-developed and well-groomed. He is obese.   HENT:      Head: Normocephalic and atraumatic.      Jaw: There is normal jaw occlusion.      Right Ear: Hearing, tympanic membrane, ear canal and external ear normal.      Left Ear: Hearing, tympanic membrane, ear canal and external ear normal.      Nose: Nose normal.      Mouth/Throat:      Lips: Pink.      Mouth: Mucous membranes are moist.      Pharynx: Oropharynx is clear.   Eyes:      General: Lids are normal. Vision grossly intact. Gaze aligned appropriately.      Extraocular Movements: Extraocular movements intact.      Conjunctiva/sclera: Conjunctivae normal.      Pupils: Pupils are equal, round, and reactive to light.   Neck:      Trachea: Trachea and phonation normal.   Cardiovascular:      Rate and Rhythm: Normal rate and regular rhythm.      Pulses: Normal pulses.      Heart sounds: Normal heart sounds.   Pulmonary:      Effort: Pulmonary effort is normal.      Breath sounds: Normal breath sounds and air entry.   Abdominal:      General: Abdomen is protuberant. Bowel sounds are normal.      Palpations: Abdomen is soft.   Genitourinary:     Rectum: Normal.   Musculoskeletal:         General: Normal range of motion.      Right shoulder: Normal.      Left shoulder: Normal.      Right upper arm: Normal.      Left upper arm: Normal.      Right elbow: Normal.      Left elbow: Normal.      Right forearm: Normal.      Left forearm: Normal.      Right wrist: Normal.      Left wrist: Normal.      Right hand: Normal.      Left hand: Normal.      Cervical back: Normal, full passive range of motion without pain, normal range of motion and neck supple.      " Thoracic back: Normal.      Lumbar back: Normal.      Right hip: Normal.      Left hip: Normal.      Right upper leg: Normal.      Left upper leg: Normal.      Right knee: Normal.      Left knee: Normal.      Right lower leg: Normal. No edema.      Left lower leg: Normal. No edema.      Right ankle: Normal.      Right Achilles Tendon: Normal.      Left ankle: Normal.      Left Achilles Tendon: Normal.      Right foot: Normal.      Left foot: Normal.   Lymphadenopathy:      Cervical: No cervical adenopathy.   Skin:     General: Skin is warm.      Capillary Refill: Capillary refill takes less than 2 seconds.   Neurological:      General: No focal deficit present.      Mental Status: He is alert and oriented to person, place, and time.      Cranial Nerves: Cranial nerves 2-12 are intact.      Sensory: Sensation is intact.      Motor: Motor function is intact.      Coordination: Coordination is intact.      Gait: Gait is intact.      Deep Tendon Reflexes: Reflexes are normal and symmetric.   Psychiatric:         Attention and Perception: Attention and perception normal.         Mood and Affect: Mood and affect normal.         Speech: Speech normal.         Behavior: Behavior normal. Behavior is cooperative.         Thought Content: Thought content normal.         Cognition and Memory: Cognition and memory normal.         Judgment: Judgment normal.      Result Review :  The following data was reviewed by: Oseas Lam MD on 08/16/2023:  Common labs          8/24/2022    09:02 11/29/2022    11:08 5/16/2023    11:14   Common Labs   Glucose 97  101  105    BUN 18  17  21    Creatinine 1.01  0.93  1.08    Sodium 140  139  142    Potassium 3.7  4.0  4.1    Chloride 101  102  104    Calcium 9.3  8.9  9.5    Albumin 4.16  4.09  4.3    Total Bilirubin 0.4  0.5  0.7    Alkaline Phosphatase 75  79  84    AST (SGOT) 30  33  25    ALT (SGPT) 31  51  29    WBC 8.42      Hemoglobin 16.2      Hematocrit 47.1      Platelets 200       Total Cholesterol 212  141  136    Triglycerides 194  137  140    HDL Cholesterol 46  50  55    LDL Cholesterol  131  67  57    Hemoglobin A1C 5.80  5.50     PSA 0.401      Uric Acid 7.1  6.8       Data reviewed : Radiologic studies 11/29/2022 DEXA            Assessment and Plan   Diagnoses and all orders for this visit:    1. Encounter for annual physical exam (Primary)  -     CBC & Differential; Future  -     Comprehensive Metabolic Panel; Future  -     Lipid Panel; Future  -     Hemoglobin A1c; Future  -     PSA Screen; Future  -     Vitamin D,25-Hydroxy; Future  -     Vitamin B12; Future  -     Uric Acid  -     POC Urinalysis Dipstick  -     Vitamin B12  -     Vitamin D,25-Hydroxy  -     PSA Screen  -     Hemoglobin A1c  -     Lipid Panel  -     Comprehensive Metabolic Panel  -     CBC & Differential    2. Screening for prostate cancer  -     PSA Screen; Future  -     PSA Screen    3. Primary hypertension  Assessment & Plan:  Hypertension is improving with treatment.  Continue current treatment regimen.  Dietary sodium restriction.  Weight loss.  Regular aerobic exercise.  Blood pressure will be reassessed at the next regular appointment.    Orders:  -     CBC & Differential; Future  -     Comprehensive Metabolic Panel; Future  -     Lipid Panel; Future  -     Hemoglobin A1c; Future  -     PSA Screen; Future  -     Vitamin D,25-Hydroxy; Future  -     Vitamin B12; Future  -     Uric Acid  -     POC Urinalysis Dipstick  -     Vitamin B12  -     Vitamin D,25-Hydroxy  -     PSA Screen  -     Hemoglobin A1c  -     Lipid Panel  -     Comprehensive Metabolic Panel  -     CBC & Differential    4. Mixed hyperlipidemia  Assessment & Plan:  Lipid abnormalities are improving with treatment.  Nutritional counseling was provided. and Pharmacotherapy as ordered.  Lipids will be reassessed in 3 months.    Orders:  -     CBC & Differential; Future  -     Comprehensive Metabolic Panel; Future  -     Lipid Panel; Future  -      Hemoglobin A1c; Future  -     PSA Screen; Future  -     Vitamin D,25-Hydroxy; Future  -     Vitamin B12; Future  -     Uric Acid  -     POC Urinalysis Dipstick  -     Vitamin B12  -     Vitamin D,25-Hydroxy  -     PSA Screen  -     Hemoglobin A1c  -     Lipid Panel  -     Comprehensive Metabolic Panel  -     CBC & Differential    5. Acute idiopathic gout of multiple sites  Assessment & Plan:  No exacerbation reported    Orders:  -     CBC & Differential; Future  -     Comprehensive Metabolic Panel; Future  -     Lipid Panel; Future  -     Hemoglobin A1c; Future  -     PSA Screen; Future  -     Vitamin D,25-Hydroxy; Future  -     Vitamin B12; Future  -     Uric Acid  -     POC Urinalysis Dipstick  -     Vitamin B12  -     Vitamin D,25-Hydroxy  -     PSA Screen  -     Hemoglobin A1c  -     Lipid Panel  -     Comprehensive Metabolic Panel  -     CBC & Differential           I spent 25 minutes caring for Barrie on this date of service. This time includes time spent by me in the following activities:reviewing tests, obtaining and/or reviewing a separately obtained history, performing a medically appropriate examination and/or evaluation , counseling and educating the patient/family/caregiver, ordering medications, tests, or procedures, and documenting information in the medical record     The preventive exam has been reviewed in detail.  The patient has been fully counseled on preventative guidelines for vaccines, cancer screenings, and other health maintenance needs.   The patient has been counseled on guidelines for maintaining a lifestyle to promote good health and to minimize chronic diseases.  The patient has been assisted with scheduling these healthcare procedures for the coming year and given a written document of health maintenance and anticipatory guidance for age with the AVS.       Follow Up   Return in about 6 months (around 2/16/2024) for Recheck, RTC FASTING LABS & , RTC FASTING LABS on;y next week  8/25.  Patient was given instructions and counseling regarding his condition or for health maintenance advice. Please see specific information pulled into the AVS if appropriate.           This document has been electronically signed by Oseas Lam MD  August 17, 2023 14:49 EDT

## 2023-08-24 ENCOUNTER — LAB (OUTPATIENT)
Dept: FAMILY MEDICINE CLINIC | Facility: CLINIC | Age: 68
End: 2023-08-24
Payer: COMMERCIAL

## 2023-08-24 LAB
ALBUMIN SERPL-MCNC: 4.3 G/DL (ref 3.5–5.2)
ALBUMIN/GLOB SERPL: 1.7 G/DL
ALP SERPL-CCNC: 79 U/L (ref 39–117)
ALT SERPL W P-5'-P-CCNC: 30 U/L (ref 1–41)
ANION GAP SERPL CALCULATED.3IONS-SCNC: 9.3 MMOL/L (ref 5–15)
AST SERPL-CCNC: 23 U/L (ref 1–40)
BASOPHILS # BLD AUTO: 0.05 10*3/MM3 (ref 0–0.2)
BASOPHILS NFR BLD AUTO: 0.6 % (ref 0–1.5)
BILIRUB SERPL-MCNC: 0.5 MG/DL (ref 0–1.2)
BUN SERPL-MCNC: 19 MG/DL (ref 8–23)
BUN/CREAT SERPL: 19 (ref 7–25)
CALCIUM SPEC-SCNC: 9.1 MG/DL (ref 8.6–10.5)
CHLORIDE SERPL-SCNC: 103 MMOL/L (ref 98–107)
CHOLEST SERPL-MCNC: 133 MG/DL (ref 0–200)
CO2 SERPL-SCNC: 26.7 MMOL/L (ref 22–29)
CREAT SERPL-MCNC: 1 MG/DL (ref 0.76–1.27)
DEPRECATED RDW RBC AUTO: 45 FL (ref 37–54)
EGFRCR SERPLBLD CKD-EPI 2021: 81.5 ML/MIN/1.73
EOSINOPHIL # BLD AUTO: 0.26 10*3/MM3 (ref 0–0.4)
EOSINOPHIL NFR BLD AUTO: 3 % (ref 0.3–6.2)
ERYTHROCYTE [DISTWIDTH] IN BLOOD BY AUTOMATED COUNT: 13.2 % (ref 12.3–15.4)
GLOBULIN UR ELPH-MCNC: 2.6 GM/DL
GLUCOSE SERPL-MCNC: 96 MG/DL (ref 65–99)
HBA1C MFR BLD: 5.7 % (ref 4.8–5.6)
HCT VFR BLD AUTO: 45.7 % (ref 37.5–51)
HDLC SERPL-MCNC: 60 MG/DL (ref 40–60)
HGB BLD-MCNC: 15.2 G/DL (ref 13–17.7)
IMM GRANULOCYTES # BLD AUTO: 0.03 10*3/MM3 (ref 0–0.05)
IMM GRANULOCYTES NFR BLD AUTO: 0.3 % (ref 0–0.5)
LDLC SERPL CALC-MCNC: 51 MG/DL (ref 0–100)
LDLC/HDLC SERPL: 0.79 {RATIO}
LYMPHOCYTES # BLD AUTO: 2.86 10*3/MM3 (ref 0.7–3.1)
LYMPHOCYTES NFR BLD AUTO: 33 % (ref 19.6–45.3)
MCH RBC QN AUTO: 30.8 PG (ref 26.6–33)
MCHC RBC AUTO-ENTMCNC: 33.3 G/DL (ref 31.5–35.7)
MCV RBC AUTO: 92.5 FL (ref 79–97)
MONOCYTES # BLD AUTO: 0.69 10*3/MM3 (ref 0.1–0.9)
MONOCYTES NFR BLD AUTO: 8 % (ref 5–12)
NEUTROPHILS NFR BLD AUTO: 4.77 10*3/MM3 (ref 1.7–7)
NEUTROPHILS NFR BLD AUTO: 55.1 % (ref 42.7–76)
NRBC BLD AUTO-RTO: 0 /100 WBC (ref 0–0.2)
PLATELET # BLD AUTO: 190 10*3/MM3 (ref 140–450)
PMV BLD AUTO: 11.9 FL (ref 6–12)
POTASSIUM SERPL-SCNC: 4.1 MMOL/L (ref 3.5–5.2)
PROT SERPL-MCNC: 6.9 G/DL (ref 6–8.5)
RBC # BLD AUTO: 4.94 10*6/MM3 (ref 4.14–5.8)
SODIUM SERPL-SCNC: 139 MMOL/L (ref 136–145)
TRIGL SERPL-MCNC: 129 MG/DL (ref 0–150)
URATE SERPL-MCNC: 5.1 MG/DL (ref 3.4–7)
VLDLC SERPL-MCNC: 22 MG/DL (ref 5–40)
WBC NRBC COR # BLD: 8.66 10*3/MM3 (ref 3.4–10.8)

## 2023-08-24 PROCEDURE — 82607 VITAMIN B-12: CPT | Performed by: PSYCHOLOGIST

## 2023-08-24 PROCEDURE — 80053 COMPREHEN METABOLIC PANEL: CPT | Performed by: PSYCHOLOGIST

## 2023-08-24 PROCEDURE — 84550 ASSAY OF BLOOD/URIC ACID: CPT | Performed by: PSYCHOLOGIST

## 2023-08-24 PROCEDURE — 82306 VITAMIN D 25 HYDROXY: CPT | Performed by: PSYCHOLOGIST

## 2023-08-24 PROCEDURE — 85025 COMPLETE CBC W/AUTO DIFF WBC: CPT | Performed by: PSYCHOLOGIST

## 2023-08-24 PROCEDURE — 83036 HEMOGLOBIN GLYCOSYLATED A1C: CPT | Performed by: PSYCHOLOGIST

## 2023-08-24 PROCEDURE — 80061 LIPID PANEL: CPT | Performed by: PSYCHOLOGIST

## 2023-08-24 PROCEDURE — G0103 PSA SCREENING: HCPCS | Performed by: PSYCHOLOGIST

## 2023-08-25 LAB
25(OH)D3 SERPL-MCNC: 28.8 NG/ML (ref 30–100)
PSA SERPL-MCNC: 0.42 NG/ML (ref 0–4)
VIT B12 BLD-MCNC: 413 PG/ML (ref 211–946)

## 2023-09-13 NOTE — PROGRESS NOTES
Please call patient regarding critical lab results.  He still needs to continue supplement vitamin D down from 30-28.8.  It has already further questions please tell him to get me call back.  Reassure patient.

## 2023-09-19 ENCOUNTER — OFFICE VISIT (OUTPATIENT)
Dept: FAMILY MEDICINE CLINIC | Facility: CLINIC | Age: 68
End: 2023-09-19
Payer: COMMERCIAL

## 2023-09-19 VITALS
DIASTOLIC BLOOD PRESSURE: 76 MMHG | WEIGHT: 252 LBS | RESPIRATION RATE: 18 BRPM | BODY MASS INDEX: 33.4 KG/M2 | HEIGHT: 73 IN | OXYGEN SATURATION: 97 % | HEART RATE: 71 BPM | SYSTOLIC BLOOD PRESSURE: 134 MMHG

## 2023-09-19 DIAGNOSIS — J20.9 BRONCHITIS WITH ASTHMA, ACUTE: ICD-10-CM

## 2023-09-19 DIAGNOSIS — I10 PRIMARY HYPERTENSION: ICD-10-CM

## 2023-09-19 DIAGNOSIS — E78.2 MIXED HYPERLIPIDEMIA: ICD-10-CM

## 2023-09-19 DIAGNOSIS — E66.09 CLASS 1 OBESITY DUE TO EXCESS CALORIES WITH SERIOUS COMORBIDITY AND BODY MASS INDEX (BMI) OF 33.0 TO 33.9 IN ADULT: ICD-10-CM

## 2023-09-19 DIAGNOSIS — R05.9 COUGH, UNSPECIFIED TYPE: ICD-10-CM

## 2023-09-19 DIAGNOSIS — R07.9 CHEST PAIN, UNSPECIFIED TYPE: Primary | ICD-10-CM

## 2023-09-19 DIAGNOSIS — J45.909 BRONCHITIS WITH ASTHMA, ACUTE: ICD-10-CM

## 2023-09-19 PROBLEM — E66.811 CLASS 1 OBESITY DUE TO EXCESS CALORIES WITH SERIOUS COMORBIDITY AND BODY MASS INDEX (BMI) OF 33.0 TO 33.9 IN ADULT: Status: ACTIVE | Noted: 2023-09-19

## 2023-09-19 LAB
AMYLASE SERPL-CCNC: 46 U/L (ref 28–100)
LIPASE SERPL-CCNC: 21 U/L (ref 13–60)

## 2023-09-19 PROCEDURE — 93000 ELECTROCARDIOGRAM COMPLETE: CPT | Performed by: PSYCHOLOGIST

## 2023-09-19 PROCEDURE — 82150 ASSAY OF AMYLASE: CPT | Performed by: PSYCHOLOGIST

## 2023-09-19 PROCEDURE — 83690 ASSAY OF LIPASE: CPT | Performed by: PSYCHOLOGIST

## 2023-09-19 PROCEDURE — 99214 OFFICE O/P EST MOD 30 MIN: CPT | Performed by: PSYCHOLOGIST

## 2023-09-19 RX ORDER — ALBUTEROL SULFATE 90 UG/1
2 AEROSOL, METERED RESPIRATORY (INHALATION) 3 TIMES DAILY
Qty: 18 G | Refills: 0 | Status: SHIPPED | OUTPATIENT
Start: 2023-09-19 | End: 2023-09-26

## 2023-09-19 RX ORDER — SEMAGLUTIDE 0.5 MG/.5ML
0.25 INJECTION, SOLUTION SUBCUTANEOUS WEEKLY
Qty: 1 ML | Refills: 0 | Status: SHIPPED | OUTPATIENT
Start: 2023-09-19 | End: 2023-10-11

## 2023-09-19 RX ORDER — AZITHROMYCIN 250 MG/1
TABLET, FILM COATED ORAL
Qty: 6 TABLET | Refills: 1 | Status: SHIPPED | OUTPATIENT
Start: 2023-09-19

## 2023-09-19 RX ORDER — BENZONATATE 100 MG/1
100 CAPSULE ORAL 3 TIMES DAILY PRN
Qty: 30 CAPSULE | Refills: 0 | Status: SHIPPED | OUTPATIENT
Start: 2023-09-19 | End: 2023-09-29

## 2023-09-19 NOTE — PROGRESS NOTES
Chief Complaint  Elevated Blood Pressure and Cough (Cough & Congestion, - Home Covid test was negative.)    Subjective        Barrie Rose presents to [facility]  C/o follow up HTN  2. Cough / congestion   Cough  This is a new problem. The current episode started in the past 7 days. The problem has been gradually worsening. The problem occurs every few minutes. The cough is Productive of sputum. Associated symptoms include chest pain, nasal congestion and rhinorrhea. Pertinent negatives include no fever, headaches or shortness of breath. He has tried OTC cough suppressant for the symptoms. The treatment provided mild relief. There is no history of asthma, COPD or emphysema.   Chest Pain   This is a new problem. The current episode started in the past 7 days. The onset quality is sudden. The problem occurs intermittently. The problem has been waxing and waning. The pain is present in the substernal region and lateral region. The pain is at a severity of 7/10. The pain is moderate. The quality of the pain is described as sharp. Associated symptoms include a cough. Pertinent negatives include no abdominal pain, diaphoresis, dizziness, fever, headaches, nausea, near-syncope, numbness, shortness of breath, syncope or vomiting.   His past medical history is significant for hyperlipidemia and hypertension.   Pertinent negatives for past medical history include no thyroid problem.   His family medical history is significant for CAD, diabetes, hyperlipidemia and hypertension.   Pertinent negatives for family medical history include: no stroke.   Hypertension  This is a chronic problem. The current episode started more than 1 year ago. The problem has been waxing and waning since onset. The problem is uncontrolled. Associated symptoms include chest pain. Pertinent negatives include no headaches or shortness of breath. Risk factors for coronary artery disease include obesity, dyslipidemia and  "smoking/tobacco exposure. Past treatments include lifestyle changes, diuretics and angiotensin blockers. Current antihypertension treatment includes lifestyle changes, diuretics and angiotensin blockers. The current treatment provides mild improvement. There are no compliance problems.  There is no history of angina, kidney disease or CAD/MI. There is no history of chronic renal disease or a thyroid problem.   Obesity  This is a chronic problem. The current episode started more than 1 year ago. The problem occurs constantly. The problem has been gradually worsening. Associated symptoms include chest pain, coughing and fatigue. Pertinent negatives include no abdominal pain, diaphoresis, fever, headaches, nausea, numbness or vomiting. He has tried walking for the symptoms. The treatment provided mild relief.     Objective   Vital Signs:  /76   Pulse 71   Resp 18   Ht 185.4 cm (73\")   Wt 114 kg (252 lb)   SpO2 97%   BMI 33.25 kg/m²   Estimated body mass index is 33.25 kg/m² as calculated from the following:    Height as of this encounter: 185.4 cm (73\").    Weight as of this encounter: 114 kg (252 lb).        Physical Exam  Vitals and nursing note reviewed.   Constitutional:       Appearance: Normal appearance. He is obese.   HENT:      Head: Normocephalic.      Right Ear: Tympanic membrane normal.      Left Ear: Tympanic membrane normal.      Nose: Nose normal.      Mouth/Throat:      Mouth: Mucous membranes are moist.   Eyes:      Extraocular Movements: Extraocular movements intact.      Pupils: Pupils are equal, round, and reactive to light.   Cardiovascular:      Rate and Rhythm: Normal rate and regular rhythm.      Pulses: Normal pulses.      Heart sounds: Normal heart sounds.   Pulmonary:      Effort: Pulmonary effort is normal.      Breath sounds: Decreased air movement present. Examination of the right-lower field reveals decreased breath sounds. Examination of the left-lower field reveals decreased " breath sounds. Decreased breath sounds present.   Abdominal:      General: Abdomen is protuberant. Bowel sounds are normal.      Palpations: Abdomen is soft.   Musculoskeletal:         General: Normal range of motion.      Cervical back: Normal range of motion and neck supple.   Skin:     General: Skin is warm.      Capillary Refill: Capillary refill takes less than 2 seconds.   Neurological:      General: No focal deficit present.      Mental Status: He is alert and oriented to person, place, and time.   Psychiatric:         Mood and Affect: Mood normal.      Result Review :  The following data was reviewed by: Oseas Lam MD on 09/19/2023:  Common labs          11/29/2022    11:08 5/16/2023    11:14 8/24/2023    08:34   Common Labs   Glucose 101  105  96    BUN 17  21  19    Creatinine 0.93  1.08  1.00    Sodium 139  142  139    Potassium 4.0  4.1  4.1    Chloride 102  104  103    Calcium 8.9  9.5  9.1    Albumin 4.09  4.3  4.3    Total Bilirubin 0.5  0.7  0.5    Alkaline Phosphatase 79  84  79    AST (SGOT) 33  25  23    ALT (SGPT) 51  29  30    WBC   8.66    Hemoglobin   15.2    Hematocrit   45.7    Platelets   190    Total Cholesterol 141  136  133    Triglycerides 137  140  129    HDL Cholesterol 50  55  60    LDL Cholesterol  67  57  51    Hemoglobin A1C 5.50   5.70    PSA   0.419    Uric Acid 6.8   5.1      Data reviewed : Radiologic studies 11/29/2022 DEXA       ECG 12 Lead    Date/Time: 9/19/2023 9:45 AM  Performed by: Oseas Lam MD  Authorized by: Oseas Lam MD   Comparison: not compared with previous ECG   Rhythm: sinus rhythm  Rate: normal  BPM: 65  Conduction: conduction normal  Other findings: non-specific ST-T wave changes    Clinical impression: abnormal EKG  Comments: REFERRED TO CARDIO           Assessment and Plan   Diagnoses and all orders for this visit:    1. Chest pain, unspecified type (Primary)  Comments:  REFERRED TO CARDIO AND MESSAGED DR NARAYAN TO WORK  PATIENT IN THIS WEEK OR TODAY  Orders:  -     Abdominal Aortic Aneurysm Screening Medicare CAR; Future  -     Cancel: Ambulatory Referral to Cardiology  -     ECG 12 Lead  -     Ambulatory Referral to Cardiology    2. Primary hypertension  -     Abdominal Aortic Aneurysm Screening Medicare CAR; Future  -     Cancel: Ambulatory Referral to Cardiology  -     ECG 12 Lead  -     Ambulatory Referral to Cardiology    3. Mixed hyperlipidemia  -     Abdominal Aortic Aneurysm Screening Medicare CAR; Future  -     Cancel: Ambulatory Referral to Cardiology    4. Cough, unspecified type    5. Bronchitis with asthma, acute    6. Class 1 obesity due to excess calories with serious comorbidity and body mass index (BMI) of 33.0 to 33.9 in adult  Comments:  LIFESTYLE MODIFICATION WITH DIET/EXERCISE  APPRVL CODE FROM [place] 8/201/23-3/17/24  12 PENS / 84 DAYTS  Assessment & Plan:  Patient's (Body mass index is 33.25 kg/m².) indicates that they are obese (BMI >30) with health conditions that include obstructive sleep apnea, hypertension, coronary heart disease, diabetes mellitus, dyslipidemias, GERD, peripheral vascular disease, and osteoarthritis . Weight is worsening. BMI  is above average; BMI management plan is completed. We discussed portion control, increasing exercise, and joining a fitness center or start home based exercise program.       Other orders  -     albuterol sulfate  (90 Base) MCG/ACT inhaler; Inhale 2 puffs 3 (Three) Times a Day for 7 days.  Dispense: 18 g; Refill: 0  -     azithromycin (Zithromax) 250 MG tablet; Take 2 tablets the first day, then 1 tablet daily for 4 days.  Dispense: 6 tablet; Refill: 1  -     benzonatate (Tessalon Perles) 100 MG capsule; Take 1 capsule by mouth 3 (Three) Times a Day As Needed for Cough for up to 10 days.  Dispense: 30 capsule; Refill: 0         I spent 30 minutes caring for Barrie on this date of service. This time includes time spent by me in the following  activities:reviewing tests, obtaining and/or reviewing a separately obtained history, performing a medically appropriate examination and/or evaluation , counseling and educating the patient/family/caregiver, ordering medications, tests, or procedures, and documenting information in the medical record   REFERRAL DONE AND COORDINATED WITH CARDIO- DR NARAYAN FOR AN APPT.    Follow Up   Return in about 1 month (around 10/19/2023) for Recheck-- chest pain/ cardio consult .  Patient was given instructions and counseling regarding his condition or for health maintenance advice. Please see specific information pulled into the AVS if appropriate.           This document has been electronically signed by Oseas Lam MD  September 19, 2023 10:29 EDT

## 2023-09-19 NOTE — ASSESSMENT & PLAN NOTE
Patient's (Body mass index is 33.25 kg/m².) indicates that they are obese (BMI >30) with health conditions that include obstructive sleep apnea, hypertension, coronary heart disease, diabetes mellitus, dyslipidemias, GERD, peripheral vascular disease, and osteoarthritis . Weight is worsening. BMI  is above average; BMI management plan is completed. We discussed portion control, increasing exercise, and joining a fitness center or start home based exercise program.

## 2023-09-21 ENCOUNTER — OFFICE VISIT (OUTPATIENT)
Dept: CARDIOLOGY | Facility: CLINIC | Age: 68
End: 2023-09-21
Payer: COMMERCIAL

## 2023-09-21 ENCOUNTER — TELEPHONE (OUTPATIENT)
Dept: FAMILY MEDICINE CLINIC | Facility: CLINIC | Age: 68
End: 2023-09-21

## 2023-09-21 VITALS
BODY MASS INDEX: 33.16 KG/M2 | HEART RATE: 69 BPM | DIASTOLIC BLOOD PRESSURE: 78 MMHG | SYSTOLIC BLOOD PRESSURE: 136 MMHG | HEIGHT: 73 IN | WEIGHT: 250.2 LBS

## 2023-09-21 DIAGNOSIS — R06.02 SHORTNESS OF BREATH: ICD-10-CM

## 2023-09-21 DIAGNOSIS — E78.00 HYPERCHOLESTEROLEMIA: ICD-10-CM

## 2023-09-21 DIAGNOSIS — I10 PRIMARY HYPERTENSION: ICD-10-CM

## 2023-09-21 DIAGNOSIS — K21.00 GASTROESOPHAGEAL REFLUX DISEASE WITH ESOPHAGITIS WITHOUT HEMORRHAGE: ICD-10-CM

## 2023-09-21 DIAGNOSIS — I20.8 STABLE ANGINA PECTORIS: Primary | ICD-10-CM

## 2023-09-21 PROCEDURE — 93000 ELECTROCARDIOGRAM COMPLETE: CPT | Performed by: NURSE PRACTITIONER

## 2023-09-21 PROCEDURE — 99204 OFFICE O/P NEW MOD 45 MIN: CPT | Performed by: NURSE PRACTITIONER

## 2023-09-21 RX ORDER — LORATADINE 10 MG/1
10 TABLET ORAL DAILY
COMMUNITY

## 2023-09-21 NOTE — LETTER
September 21, 2023       No Recipients    Patient: Barrie Rose   YOB: 1955   Date of Visit: 9/21/2023       Dear Oseas Lam MD,    Thank you for referring Barrie Rose to me for evaluation. Below is a copy of my consult note.    If you have questions, please do not hesitate to call me. I look forward to following Barrie along with you.         Sincerely,        EDGAR Lazaro        CC:   No Recipients    Subjective    Chief Complaint   Patient presents with   • Establish Care     Patient referred per PCP for chest pain.   • Lab     Last  labs in chart.   • Chest Pain     Started noticing with congestion about a week ago. Over the weekend he was watching football game, noticed sharp pain that started on right side of chest that radiated across to left chest, short in duration. He did check B/P, B/P 175/100.    • HTN     Had been stable until he started taking OTC decongestants, BC powders, and cough medications.    • Family history     Father had heart disease with 11 stents, with hx of x2 heart attacks.   • Aspirin     Patient does not take.     Initial cardiac evaluation;    HPI  Barrie Rose is a 68 year old gentleman with hypertension and hyperlipidemia referred for evaluation of chest pain. Blood pressure well controlled with losartan/hctz. Lipitor 20 mg initiated one year ago for  that improved to 51. Approximately one week ago he developed chest congestion, cough. He took some OTC decongestant and cough suppressant. He was watching football on Saturday when he began having sharp, shooting substernal chest pain radiating to the right side of his chest. Pain did not last long but was intense. No nausea or diaphoresis. No recurrent chest pain. He checked blood pressure during this time, 175/100. BP improved to normal over the last 2-3 days. He saw PCP who checked amylase, lipase which were normal and called for consult. Prior to chest congestion, he had no anginal  chest pain.  He admits to mild dyspnea with exertion.  He is quite active, golfs regularly, continues to work from home. Father with CAD, first MI at 49, eleven stents,  at 88. Mother with no heart disease. Labs 23: , , HDL 60, LDL 51, normal CBC, normal CMP, normal uric acid and B12, A1c 5.7%.  Wegovy prescribed to aid in weight loss efforts, has not started yet.  Patient smokes cigars but does not inhale.  Drinks less than 2 alcoholic beverages per day.  Stress test approximately 15 years ago reported as normal.  No other cardiac testing done.    Cardiac History  Past Surgical History:   Procedure Laterality Date   • APPENDECTOMY     • COLONOSCOPY     • CYST REMOVAL Left 2023    BACK OF LEFT LEG   • VASECTOMY       Current Outpatient Medications   Medication Sig Dispense Refill   • albuterol sulfate  (90 Base) MCG/ACT inhaler Inhale 2 puffs 3 (Three) Times a Day for 7 days. 18 g 0   • allopurinol (Zyloprim) 100 MG tablet Take 1 tablet by mouth Daily With Breakfast for 180 days. 30 tablet 5   • Aspirin-Caffeine 845-65 MG pack Take  by mouth As Needed.     • atorvastatin (LIPITOR) 20 MG tablet Take 1 tablet by mouth Every Night for 180 days. 30 tablet 5   • azithromycin (Zithromax) 250 MG tablet Take 2 tablets the first day, then 1 tablet daily for 4 days. 6 tablet 1   • benzonatate (Tessalon Perles) 100 MG capsule Take 1 capsule by mouth 3 (Three) Times a Day As Needed for Cough for up to 10 days. 30 capsule 0   • celecoxib (CeleBREX) 200 MG capsule Take 2 capsules by mouth Daily.     • loratadine (CLARITIN) 10 MG tablet Take 1 tablet by mouth Daily.     • losartan-hydrochlorothiazide (HYZAAR) 100-12.5 MG per tablet TAKE ONE TABLET BY MOUTH DAILY WITH BREAKFAST 90 tablet 0   • Pseudoephedrine-guaiFENesin (MUCINEX D PO) Take 1 tablet by mouth As Needed.     • Semaglutide-Weight Management (Wegovy) 0.5 MG/0.5ML solution auto-injector Inject 0.25 mL under the skin into the  "appropriate area as directed 1 (One) Time Per Week for 4 doses. 1 mL 0     No current facility-administered medications for this visit.     Bee venom    Past Medical History:   Diagnosis Date   • Cancer     hx of skin cancer   • Gout    • Hypertension      Social History     Socioeconomic History   • Marital status:    Tobacco Use   • Smoking status: Every Day     Types: Cigars     Start date: 1970     Passive exposure: Current   • Smokeless tobacco: Never   Vaping Use   • Vaping Use: Never used   Substance and Sexual Activity   • Alcohol use: Yes     Alcohol/week: 10.0 standard drinks     Types: 10 Shots of liquor per week   • Drug use: Never   • Sexual activity: Yes     Partners: Female     Birth control/protection: None     Family History   Problem Relation Age of Onset   • No Known Problems Mother    • Heart attack Father         11 stents   • Cancer Father    • Heart disease Father    • Hypertension Sister    • Heart disease Paternal Grandmother    • No Known Problems Son      Review of Systems   Constitutional: Negative.    HENT: Negative.     Eyes: Negative.    Respiratory:  Positive for cough, chest tightness and shortness of breath.    Cardiovascular:  Positive for chest pain.   Gastrointestinal: Negative.         Heartburn/acid reflux   Endocrine: Negative.    Genitourinary:  Positive for hematuria (Noted on UA).   Musculoskeletal: Negative.    Skin: Negative.    Allergic/Immunologic: Negative.    Neurological: Negative.    Hematological: Negative.    Psychiatric/Behavioral: Negative.       Diabetes- No  Thyroid- normal    Objective    /78 (BP Location: Left arm)   Pulse 69   Ht 185.4 cm (73\")   Wt 113 kg (250 lb 3.2 oz)   BMI 33.01 kg/m²     Physical Exam  Constitutional:       Appearance: Normal appearance. He is not toxic-appearing.   HENT:      Head: Normocephalic and atraumatic.      Right Ear: External ear normal.      Left Ear: External ear normal.      Mouth/Throat:      Mouth: " Mucous membranes are moist.      Pharynx: Oropharynx is clear.   Eyes:      General: No scleral icterus.     Extraocular Movements: Extraocular movements intact.      Pupils: Pupils are equal, round, and reactive to light.   Neck:      Vascular: No carotid bruit.   Cardiovascular:      Rate and Rhythm: Normal rate and regular rhythm.      Pulses: Normal pulses.      Heart sounds: Murmur (1/6 soft, holosystolic murmur) heard.   Pulmonary:      Effort: Pulmonary effort is normal. No respiratory distress.      Breath sounds: Normal breath sounds. No wheezing.   Abdominal:      General: Bowel sounds are normal.      Tenderness: There is no abdominal tenderness.   Musculoskeletal:         General: No swelling or tenderness. Normal range of motion.      Cervical back: Normal range of motion.   Skin:     General: Skin is warm and dry.      Capillary Refill: Capillary refill takes less than 2 seconds.   Neurological:      General: No focal deficit present.      Mental Status: He is alert and oriented to person, place, and time.   Psychiatric:         Mood and Affect: Mood normal.         Behavior: Behavior normal.       ECG 12 Lead    Date/Time: 9/21/2023 12:36 PM  Performed by: Nellie Rush APRN  Authorized by: Nellie Rush APRN   Comparison: not compared with previous ECG   Rhythm: sinus rhythm  Rate: normal  BPM: 69  ST Segments: ST segments normal    Clinical impression: normal ECG  Comments:  ms       Assessment & Plan    Diagnoses and all orders for this visit:    1. Stable angina pectoris (Primary)  -     Stress Test With Myocardial Perfusion One Day; Future  -     Adult Transthoracic Echo Complete W/ Cont if Necessary Per Protocol; Future  -     Helicobacter Pylori, IgA IgG IgM; Future    2. Primary hypertension  -     Stress Test With Myocardial Perfusion One Day; Future  -     Adult Transthoracic Echo Complete W/ Cont if Necessary Per Protocol; Future    3. Hypercholesterolemia  -     Stress Test With  Myocardial Perfusion One Day; Future  -     Adult Transthoracic Echo Complete W/ Cont if Necessary Per Protocol; Future    4. Shortness of breath  -     Stress Test With Myocardial Perfusion One Day; Future  -     Adult Transthoracic Echo Complete W/ Cont if Necessary Per Protocol; Future    5. Gastroesophageal reflux disease with esophagitis without hemorrhage  -     Helicobacter Pylori, IgA IgG IgM; Future       Clinical exam reveals normal S1, S2, soft holosystolic murmur at the mitral area 1/6 at best.  Normal peripheral pulses, no carotid bruit, lungs clear bilaterally.  EKG showed sinus rhythm at 69 bpm,  ms.    Hypertension  -Stable at 136/78  -Continue losartan/HCTZ  -Isolated hypertensive event likely secondary to decongestant use  -Echocardiogram to evaluate LVEF, LVH, mitral regurgitation    Hypercholesterolemia  -Lipids at goal with atorvastatin 20 mg nightly  -LDL 51, HDL 60,   -BMI 33  -Mediterranean diet recommended, sheet printed    Chest pain  -Atypical, EKG shows no acute changes  -Appears to be related to acute bronchitis versus hypertensive response to decongestant  -He has significant risk factors for CAD including HTN, hyperlipidemia, positive family history  -Recommend ischemic work-up with nuclear stress to evaluate blood pressure response, chronotropic response and coronary artery perfusion  -Continue statin, antihypertensive  -Advised to start aspirin 81 mg daily  -Check antibodies for H. Pylori  -Consider resuming PPI    We will check high-sensitivity CRP when he presents for stress and echo.  No changes made to his medications other than the addition of aspirin.  Further recommendations to follow nuclear stress and echo.  Patient also seen by Dr. Collazo and agreed with plan of care.    Follow-up 6 months with Dr. Collazo as requested by Dr. Lam.

## 2023-09-21 NOTE — PROGRESS NOTES
Subjective     Chief Complaint   Patient presents with    Establish Care     Patient referred per PCP for chest pain.    Lab     Last  labs in chart.    Chest Pain     Started noticing with congestion about a week ago. Over the weekend he was watching football game, noticed sharp pain that started on right side of chest that radiated across to left chest, short in duration. He did check B/P, B/P 175/100.     HTN     Had been stable until he started taking OTC decongestants, BC powders, and cough medications.     Family history     Father had heart disease with 11 stents, with hx of x2 heart attacks.    Aspirin     Patient does not take.     Initial cardiac evaluation;    HPI  Barrie Rose is a 68 year old gentleman with hypertension and hyperlipidemia referred for evaluation of chest pain. Blood pressure well controlled with losartan/hctz. Lipitor 20 mg initiated one year ago for  that improved to 51. Approximately one week ago he developed chest congestion, cough. He took some OTC decongestant and cough suppressant. He was watching football on Saturday when he began having sharp, shooting substernal chest pain radiating to the right side of his chest. Pain did not last long but was intense. No nausea or diaphoresis. No recurrent chest pain. He checked blood pressure during this time, 175/100. BP improved to normal over the last 2-3 days. He saw PCP who checked amylase, lipase which were normal and called for consult. Prior to chest congestion, he had no anginal chest pain.  He admits to mild dyspnea with exertion.  He is quite active, golfs regularly, continues to work from home. Father with CAD, first MI at 49, eleven stents,  at 88. Mother with no heart disease. Labs 23: , , HDL 60, LDL 51, normal CBC, normal CMP, normal uric acid and B12, A1c 5.7%.  Wegovy prescribed to aid in weight loss efforts, has not started yet.  Patient smokes cigars but does not inhale.  Drinks less  than 2 alcoholic beverages per day.  Stress test approximately 15 years ago reported as normal.  No other cardiac testing done.    Cardiac History  Past Surgical History:   Procedure Laterality Date    APPENDECTOMY      COLONOSCOPY      CYST REMOVAL Left 01/18/2023    BACK OF LEFT LEG    VASECTOMY       Current Outpatient Medications   Medication Sig Dispense Refill    albuterol sulfate  (90 Base) MCG/ACT inhaler Inhale 2 puffs 3 (Three) Times a Day for 7 days. 18 g 0    allopurinol (Zyloprim) 100 MG tablet Take 1 tablet by mouth Daily With Breakfast for 180 days. 30 tablet 5    Aspirin-Caffeine 845-65 MG pack Take  by mouth As Needed.      atorvastatin (LIPITOR) 20 MG tablet Take 1 tablet by mouth Every Night for 180 days. 30 tablet 5    azithromycin (Zithromax) 250 MG tablet Take 2 tablets the first day, then 1 tablet daily for 4 days. 6 tablet 1    benzonatate (Tessalon Perles) 100 MG capsule Take 1 capsule by mouth 3 (Three) Times a Day As Needed for Cough for up to 10 days. 30 capsule 0    celecoxib (CeleBREX) 200 MG capsule Take 2 capsules by mouth Daily.      loratadine (CLARITIN) 10 MG tablet Take 1 tablet by mouth Daily.      losartan-hydrochlorothiazide (HYZAAR) 100-12.5 MG per tablet TAKE ONE TABLET BY MOUTH DAILY WITH BREAKFAST 90 tablet 0    Pseudoephedrine-guaiFENesin (MUCINEX D PO) Take 1 tablet by mouth As Needed.      Semaglutide-Weight Management (Wegovy) 0.5 MG/0.5ML solution auto-injector Inject 0.25 mL under the skin into the appropriate area as directed 1 (One) Time Per Week for 4 doses. 1 mL 0     No current facility-administered medications for this visit.     Bee venom    Past Medical History:   Diagnosis Date    Cancer     hx of skin cancer    Gout     Hypertension      Social History     Socioeconomic History    Marital status:    Tobacco Use    Smoking status: Every Day     Types: Cigars     Start date: 1970     Passive exposure: Current    Smokeless tobacco: Never   Vaping  "Use    Vaping Use: Never used   Substance and Sexual Activity    Alcohol use: Yes     Alcohol/week: 10.0 standard drinks     Types: 10 Shots of liquor per week    Drug use: Never    Sexual activity: Yes     Partners: Female     Birth control/protection: None     Family History   Problem Relation Age of Onset    No Known Problems Mother     Heart attack Father         11 stents    Cancer Father     Heart disease Father     Hypertension Sister     Heart disease Paternal Grandmother     No Known Problems Son      Review of Systems   Constitutional: Negative.    HENT: Negative.     Eyes: Negative.    Respiratory:  Positive for cough, chest tightness and shortness of breath.    Cardiovascular:  Positive for chest pain.   Gastrointestinal: Negative.         Heartburn/acid reflux   Endocrine: Negative.    Genitourinary:  Positive for hematuria (Noted on UA).   Musculoskeletal: Negative.    Skin: Negative.    Allergic/Immunologic: Negative.    Neurological: Negative.    Hematological: Negative.    Psychiatric/Behavioral: Negative.       Diabetes- No  Thyroid- normal    Objective     /78 (BP Location: Left arm)   Pulse 69   Ht 185.4 cm (73\")   Wt 113 kg (250 lb 3.2 oz)   BMI 33.01 kg/m²     Physical Exam  Constitutional:       Appearance: Normal appearance. He is not toxic-appearing.   HENT:      Head: Normocephalic and atraumatic.      Right Ear: External ear normal.      Left Ear: External ear normal.      Mouth/Throat:      Mouth: Mucous membranes are moist.      Pharynx: Oropharynx is clear.   Eyes:      General: No scleral icterus.     Extraocular Movements: Extraocular movements intact.      Pupils: Pupils are equal, round, and reactive to light.   Neck:      Vascular: No carotid bruit.   Cardiovascular:      Rate and Rhythm: Normal rate and regular rhythm.      Pulses: Normal pulses.      Heart sounds: Murmur (1/6 soft, holosystolic murmur) heard.   Pulmonary:      Effort: Pulmonary effort is normal. No " respiratory distress.      Breath sounds: Normal breath sounds. No wheezing.   Abdominal:      General: Bowel sounds are normal.      Tenderness: There is no abdominal tenderness.   Musculoskeletal:         General: No swelling or tenderness. Normal range of motion.      Cervical back: Normal range of motion.   Skin:     General: Skin is warm and dry.      Capillary Refill: Capillary refill takes less than 2 seconds.   Neurological:      General: No focal deficit present.      Mental Status: He is alert and oriented to person, place, and time.   Psychiatric:         Mood and Affect: Mood normal.         Behavior: Behavior normal.       ECG 12 Lead    Date/Time: 9/21/2023 12:36 PM  Performed by: Nellie Rush APRN  Authorized by: Nellie Rush APRN   Comparison: not compared with previous ECG   Rhythm: sinus rhythm  Rate: normal  BPM: 69  ST Segments: ST segments normal    Clinical impression: normal ECG  Comments:  ms       Assessment & Plan     Diagnoses and all orders for this visit:    1. Stable angina pectoris (Primary)  -     Stress Test With Myocardial Perfusion One Day; Future  -     Adult Transthoracic Echo Complete W/ Cont if Necessary Per Protocol; Future  -     Helicobacter Pylori, IgA IgG IgM; Future    2. Primary hypertension  -     Stress Test With Myocardial Perfusion One Day; Future  -     Adult Transthoracic Echo Complete W/ Cont if Necessary Per Protocol; Future    3. Hypercholesterolemia  -     Stress Test With Myocardial Perfusion One Day; Future  -     Adult Transthoracic Echo Complete W/ Cont if Necessary Per Protocol; Future    4. Shortness of breath  -     Stress Test With Myocardial Perfusion One Day; Future  -     Adult Transthoracic Echo Complete W/ Cont if Necessary Per Protocol; Future    5. Gastroesophageal reflux disease with esophagitis without hemorrhage  -     Helicobacter Pylori, IgA IgG IgM; Future       Clinical exam reveals normal S1, S2, soft holosystolic murmur at the  mitral area 1/6 at best.  Normal peripheral pulses, no carotid bruit, lungs clear bilaterally.  EKG showed sinus rhythm at 69 bpm,  ms.    Hypertension  -Stable at 136/78  -Continue losartan/HCTZ  -Isolated hypertensive event likely secondary to decongestant use  -Echocardiogram to evaluate LVEF, LVH, mitral regurgitation    Hypercholesterolemia  -Lipids at goal with atorvastatin 20 mg nightly  -LDL 51, HDL 60,   -BMI 33  -Mediterranean diet recommended, sheet printed    Chest pain  -Atypical, EKG shows no acute changes  -Appears to be related to acute bronchitis versus hypertensive response to decongestant  -He has significant risk factors for CAD including HTN, hyperlipidemia, positive family history  -Recommend ischemic work-up with nuclear stress to evaluate blood pressure response, chronotropic response and coronary artery perfusion  -Continue statin, antihypertensive  -Advised to start aspirin 81 mg daily  -Check antibodies for H. Pylori  -Consider resuming PPI    We will check high-sensitivity CRP when he presents for stress and echo.  No changes made to his medications other than the addition of aspirin.  Further recommendations to follow nuclear stress and echo.  Patient also seen by Dr. Collazo and agreed with plan of care.    Follow-up 6 months with Dr. Collazo as requested by Dr. Lam.

## 2023-09-21 NOTE — TELEPHONE ENCOUNTER
Pharmacy Name:  [facility]    Reference Number (if applicable): [id number]    Pharmacy representative name: [facility]EST    Pharmacy representative phone number: [phone]     What medication are you calling in regards to: BETHANY    What question does the pharmacy have: CALLING TO CLARIFY THE DOSAGE.  THE PRESCRIPTION WILL BE ON HOLD UNTIL SHE GET A CALL BACK.    Who is the provider that prescribed the medication: JEFF    Additional notes:

## 2023-09-29 ENCOUNTER — CLINICAL SUPPORT (OUTPATIENT)
Dept: FAMILY MEDICINE CLINIC | Facility: CLINIC | Age: 68
End: 2023-09-29
Payer: COMMERCIAL

## 2023-09-29 NOTE — PROGRESS NOTES
Barrie Rose presents to [facility] primary care for education on how to inject Wegovy     Patient of Oseas Lam MD wanted to return to clinic for Medication demonstration.     Step 1: Gather supplies that will be needed. 1 Alcohol prep pad, 1 gauze pad or cotton ball & Wash your hands.    Step 2: Choose your injection site, Do not inject into an area where the skin is tender, bruised, red, or hard. Avoid injecting into areas with scars or stretch marks. You may inject in the same body area each week, but make sure it is not in the same spot each time    Clean the injection site with an alcohol swab or soap and water. Do not touch the injection site after cleaning.     You may inject into your upper legs (front of the thighs), lower stomach (keep 2 inches away from your belly button), or upper arm    Step 3: Remove Pen cap or Attach needle for Ozempic     Step 4: Inject medication. Instructions verbalized for whichever injection patient has. Remember to always wait for the second click on medication pens.      Patient Verbalized understanding on how to give injection, and declined any further questions.      Patient tolerated injection well with no signs or symptoms of allergic reaction or intolerance    Josseline Vega MA

## 2023-10-02 ENCOUNTER — HOSPITAL ENCOUNTER (OUTPATIENT)
Dept: CARDIOLOGY | Facility: HOSPITAL | Age: 68
Discharge: HOME | End: 2023-10-02
Payer: COMMERCIAL

## 2023-10-02 VITALS — WEIGHT: 249.12 LBS | BODY MASS INDEX: 33.02 KG/M2 | HEIGHT: 73 IN

## 2023-10-02 DIAGNOSIS — I10 PRIMARY HYPERTENSION: ICD-10-CM

## 2023-10-02 DIAGNOSIS — E78.00 HYPERCHOLESTEROLEMIA: ICD-10-CM

## 2023-10-02 DIAGNOSIS — R06.02 SHORTNESS OF BREATH: ICD-10-CM

## 2023-10-02 DIAGNOSIS — I20.89 STABLE ANGINA PECTORIS: ICD-10-CM

## 2023-10-02 PROCEDURE — 0 TECHNETIUM SESTAMIBI: Performed by: INTERNAL MEDICINE

## 2023-10-02 PROCEDURE — A9500 TC99M SESTAMIBI: HCPCS | Performed by: INTERNAL MEDICINE

## 2023-10-02 PROCEDURE — 93306 TTE W/DOPPLER COMPLETE: CPT

## 2023-10-02 PROCEDURE — 93017 CV STRESS TEST TRACING ONLY: CPT

## 2023-10-02 PROCEDURE — 78452 HT MUSCLE IMAGE SPECT MULT: CPT

## 2023-10-02 RX ADMIN — TECHNETIUM TC 99M SESTAMIBI 1 DOSE: 1 INJECTION INTRAVENOUS at 09:39

## 2023-10-02 RX ADMIN — TECHNETIUM TC 99M SESTAMIBI 1 DOSE: 1 INJECTION INTRAVENOUS at 10:13

## 2023-10-03 LAB
AORTIC DIMENSIONLESS INDEX: 0.87 (DI)
BH CV ECHO MEAS - AO MAX PG: 5.1 MMHG
BH CV ECHO MEAS - AO MEAN PG: 3.1 MMHG
BH CV ECHO MEAS - AO ROOT DIAM: 3.9 CM
BH CV ECHO MEAS - AO V2 MAX: 113 CM/SEC
BH CV ECHO MEAS - AO V2 VTI: 24.1 CM
BH CV ECHO MEAS - EDV(CUBED): 108.6 ML
BH CV ECHO MEAS - EF(MOD-BP): 54 %
BH CV ECHO MEAS - EF_3D-VOL: 52 %
BH CV ECHO MEAS - ESV(CUBED): 39.6 ML
BH CV ECHO MEAS - FS: 28.6 %
BH CV ECHO MEAS - IVS/LVPW: 0.95 CM
BH CV ECHO MEAS - IVSD: 1.03 CM
BH CV ECHO MEAS - LA DIMENSION: 3.7 CM
BH CV ECHO MEAS - LAT PEAK E' VEL: 7.2 CM/SEC
BH CV ECHO MEAS - LV MASS(C)D: 182.3 GRAMS
BH CV ECHO MEAS - LV MAX PG: 3.9 MMHG
BH CV ECHO MEAS - LV MEAN PG: 1.92 MMHG
BH CV ECHO MEAS - LV V1 MAX: 98.5 CM/SEC
BH CV ECHO MEAS - LV V1 VTI: 22 CM
BH CV ECHO MEAS - LVIDD: 4.8 CM
BH CV ECHO MEAS - LVIDS: 3.4 CM
BH CV ECHO MEAS - LVPWD: 1.08 CM
BH CV ECHO MEAS - MED PEAK E' VEL: 7.2 CM/SEC
BH CV ECHO MEAS - MV A MAX VEL: 53.1 CM/SEC
BH CV ECHO MEAS - MV DEC SLOPE: 286.6 CM/SEC2
BH CV ECHO MEAS - MV DEC TIME: 0.22 SEC
BH CV ECHO MEAS - MV E MAX VEL: 61.6 CM/SEC
BH CV ECHO MEAS - MV E/A: 1.16
BH CV ECHO MEAS - MV MAX PG: 1.53 MMHG
BH CV ECHO MEAS - MV MEAN PG: 0.7 MMHG
BH CV ECHO MEAS - MV P1/2T: 64.2 MSEC
BH CV ECHO MEAS - MV V2 VTI: 18.7 CM
BH CV ECHO MEAS - MVA(P1/2T): 3.4 CM2
BH CV ECHO MEAS - PA V2 MAX: 74.4 CM/SEC
BH CV ECHO MEAS - RAP SYSTOLE: 8 MMHG
BH CV ECHO MEAS - RV MAX PG: 1.64 MMHG
BH CV ECHO MEAS - RV V1 MAX: 64 CM/SEC
BH CV ECHO MEAS - RV V1 VTI: 13.8 CM
BH CV ECHO MEAS - RVDD: 2.5 CM
BH CV ECHO MEAS - RVSP: 11.7 MMHG
BH CV ECHO MEAS - TAPSE (>1.6): 2.7 CM
BH CV ECHO MEAS - TR MAX PG: 3.7 MMHG
BH CV ECHO MEAS - TR MAX VEL: 96.6 CM/SEC
BH CV ECHO MEASUREMENTS AVERAGE E/E' RATIO: 8.56
BH CV REST NUCLEAR ISOTOPE DOSE: 10 MCI
BH CV STRESS NUCLEAR ISOTOPE DOSE: 30 MCI
BH CV STRESS RECOVERY BP: NORMAL MMHG
BH CV STRESS RECOVERY HR: 84 BPM
BH CV XLRA - TDI S': 12.1 CM/SEC
LV EF NUC BP: 63 %
MAXIMAL PREDICTED HEART RATE: 151 BPM
PERCENT MAX PREDICTED HR: 88.08 %
SINUS: 3.9 CM
STRESS BASELINE BP: NORMAL MMHG
STRESS BASELINE HR: 65 BPM
STRESS PERCENT HR: 104 %
STRESS POST ESTIMATED WORKLOAD: 7 METS
STRESS POST EXERCISE DUR MIN: 4 MIN
STRESS POST EXERCISE DUR SEC: 50 SEC
STRESS POST PEAK BP: NORMAL MMHG
STRESS POST PEAK HR: 133 BPM
STRESS TARGET HR: 128 BPM

## 2023-10-20 ENCOUNTER — OFFICE VISIT (OUTPATIENT)
Dept: FAMILY MEDICINE CLINIC | Facility: CLINIC | Age: 68
End: 2023-10-20
Payer: COMMERCIAL

## 2023-10-20 VITALS
DIASTOLIC BLOOD PRESSURE: 70 MMHG | OXYGEN SATURATION: 93 % | HEART RATE: 79 BPM | BODY MASS INDEX: 31.97 KG/M2 | WEIGHT: 241.2 LBS | HEIGHT: 73 IN | SYSTOLIC BLOOD PRESSURE: 126 MMHG

## 2023-10-20 DIAGNOSIS — I10 PRIMARY HYPERTENSION: Primary | ICD-10-CM

## 2023-10-20 DIAGNOSIS — Z23 FLU VACCINE NEED: ICD-10-CM

## 2023-10-20 DIAGNOSIS — R10.11 RIGHT UPPER QUADRANT ABDOMINAL PAIN: ICD-10-CM

## 2023-10-20 DIAGNOSIS — R07.9 CHEST PAIN, UNSPECIFIED TYPE: ICD-10-CM

## 2023-10-20 DIAGNOSIS — E66.09 CLASS 1 OBESITY DUE TO EXCESS CALORIES WITH SERIOUS COMORBIDITY AND BODY MASS INDEX (BMI) OF 33.0 TO 33.9 IN ADULT: ICD-10-CM

## 2023-10-20 RX ORDER — SEMAGLUTIDE 0.5 MG/.5ML
0.5 INJECTION, SOLUTION SUBCUTANEOUS WEEKLY
Qty: 2 ML | Refills: 2 | Status: SHIPPED | OUTPATIENT
Start: 2023-10-20 | End: 2024-01-06

## 2023-10-20 NOTE — PROGRESS NOTES
"Chief Complaint  Chest Pain (Follow up from 9/19)    Subjective        Barrie Rose presents to [facility] PRIMARY CARE  C/o follow up visit for Chest  ( BETTER HAD A CARDIAC WORK UP) / ABDOMINAL PAIN   2. OBESITY   Chest Pain   This is a recurrent problem. The current episode started 1 to 4 weeks ago. The problem has been resolved. The patient is experiencing no pain. Associated symptoms include abdominal pain. Pertinent negatives include no fever, palpitations, PND or shortness of breath.   Pertinent negatives for past medical history include no CAD. Prior diagnostic workup includes stress echo.   Obesity  This is a chronic problem. The current episode started more than 1 year ago. The problem occurs constantly. The problem has been waxing and waning. Associated symptoms include abdominal pain, chest pain and fatigue. Pertinent negatives include no fever. He has tried walking for the symptoms. The treatment provided mild relief.       Objective   Vital Signs:  /70 (BP Location: Right arm, Patient Position: Sitting, Cuff Size: Adult)   Pulse 79   Ht 185 cm (72.84\")   Wt 109 kg (241 lb 3.2 oz)   SpO2 93%   BMI 31.96 kg/m²   Estimated body mass index is 31.96 kg/m² as calculated from the following:    Height as of this encounter: 185 cm (72.84\").    Weight as of this encounter: 109 kg (241 lb 3.2 oz).            Physical Exam  Vitals and nursing note reviewed.   Constitutional:       Appearance: Normal appearance. He is obese.   HENT:      Head: Normocephalic.      Right Ear: Tympanic membrane normal.      Left Ear: Tympanic membrane normal.      Nose: Nose normal.      Mouth/Throat:      Mouth: Mucous membranes are moist.   Eyes:      Extraocular Movements: Extraocular movements intact.      Pupils: Pupils are equal, round, and reactive to light.   Cardiovascular:      Rate and Rhythm: Normal rate and regular rhythm.      Pulses: Normal pulses.      Heart sounds: Normal heart sounds. "   Pulmonary:      Effort: Pulmonary effort is normal.      Breath sounds: Normal breath sounds.   Abdominal:      General: Abdomen is protuberant. Bowel sounds are normal.      Palpations: Abdomen is soft.      Tenderness: There is abdominal tenderness in the right upper quadrant. There is no guarding or rebound. Negative signs include Raines's sign.   Musculoskeletal:         General: Normal range of motion.      Cervical back: Normal range of motion and neck supple.   Skin:     General: Skin is warm.      Capillary Refill: Capillary refill takes less than 2 seconds.   Neurological:      General: No focal deficit present.      Mental Status: He is alert and oriented to person, place, and time.   Psychiatric:         Mood and Affect: Mood normal.        Result Review :  The following data was reviewed by: Oseas Lam MD on 10/20/2023:  Common labs          11/29/2022    11:08 5/16/2023    11:14 8/24/2023    08:34   Common Labs   Glucose 101  105  96    BUN 17  21  19    Creatinine 0.93  1.08  1.00    Sodium 139  142  139    Potassium 4.0  4.1  4.1    Chloride 102  104  103    Calcium 8.9  9.5  9.1    Albumin 4.09  4.3  4.3    Total Bilirubin 0.5  0.7  0.5    Alkaline Phosphatase 79  84  79    AST (SGOT) 33  25  23    ALT (SGPT) 51  29  30    WBC   8.66    Hemoglobin   15.2    Hematocrit   45.7    Platelets   190    Total Cholesterol 141  136  133    Triglycerides 137  140  129    HDL Cholesterol 50  55  60    LDL Cholesterol  67  57  51    Hemoglobin A1C 5.50   5.70    PSA   0.419    Uric Acid 6.8   5.1      Data reviewed : Radiologic studies 9/21/.23 Stress test            Assessment and Plan   Diagnoses and all orders for this visit:    1. Primary hypertension (Primary)    2. Class 1 obesity due to excess calories with serious comorbidity and body mass index (BMI) of 33.0 to 33.9 in adult  Comments:  HE IS TOLERATING WEGOVY  AND IN 3RD DOSE THIS WEEK HAS LOST 8 LBS    3. Right upper quadrant abdominal  pain  -     US Gallbladder; Future    4. Chest pain, unspecified type  Comments:  BETTER CARDIAC WORK UP DONE BBY DR TRIPATHI    5. Flu vaccine need    Other orders  -     Semaglutide-Weight Management (Wegovy) 0.5 MG/0.5ML solution auto-injector; Inject 0.5 mL under the skin into the appropriate area as directed 1 (One) Time Per Week for 12 doses.  Dispense: 2 mL; Refill: 2  -     Fluzone High-Dose 65+yrs (0664-2476)           I spent 20 minutes caring for Barrie on this date of service. This time includes time spent by me in the following activities:reviewing tests, obtaining and/or reviewing a separately obtained history, performing a medically appropriate examination and/or evaluation , counseling and educating the patient/family/caregiver, ordering medications, tests, or procedures, and documenting information in the medical record       Follow Up   Return in about 6 weeks (around 11/29/2023) for Recheck-- OBESITY DOSE INCREASED LAST VISIT.  Patient was given instructions and counseling regarding his condition or for health maintenance advice. Please see specific information pulled into the AVS if appropriate.           This document has been electronically signed by Oseas Lam MD  October 20, 2023 10:50 EDT

## 2023-10-26 ENCOUNTER — TELEPHONE (OUTPATIENT)
Dept: FAMILY MEDICINE CLINIC | Facility: CLINIC | Age: 68
End: 2023-10-26

## 2023-10-26 NOTE — TELEPHONE ENCOUNTER
"    Caller: Barrie Rose \"OFELIA\"    Relationship: Self    Best call back number: [phone]     Caller requesting test results:  OFELIA     What test was performed: GALL BLADDER US    When was the test performed: [id number]    Where was the test performed: [facility]    Additional notes: CALLING FOR RESULTS    "

## 2023-11-09 RX ORDER — LOSARTAN POTASSIUM AND HYDROCHLOROTHIAZIDE 12.5; 1 MG/1; MG/1
1 TABLET ORAL
Qty: 90 TABLET | Refills: 0 | Status: SHIPPED | OUTPATIENT
Start: 2023-11-09

## 2023-11-13 RX ORDER — ALLOPURINOL 100 MG/1
100 TABLET ORAL
Qty: 30 TABLET | Refills: 5 | Status: SHIPPED | OUTPATIENT
Start: 2023-11-13

## 2023-11-13 RX ORDER — ATORVASTATIN CALCIUM 20 MG/1
20 TABLET, FILM COATED ORAL NIGHTLY
Qty: 30 TABLET | Refills: 5 | Status: SHIPPED | OUTPATIENT
Start: 2023-11-13

## 2023-11-16 NOTE — TELEPHONE ENCOUNTER
"  Caller: Barrie Rose \"OFELIA\"    Relationship: Self    Best call back number: [phone]     Requested Prescriptions: HYDROCHLOROTHIAZIDE    Pharmacy where request should be sent: [facility] [id number] [address] - [phone] [facility] [phone] FX     Last office visit with prescribing clinician: 10/20/2023   Last telemedicine visit with prescribing clinician: Visit date not found   Next office visit with prescribing clinician: 12/1/2023     Additional details provided by patient: PATIENT IS OUT OF MEDICATION    Does the patient have less than a 3 day supply:  [x] Yes  [] No    Would you like a call back once the refill request has been completed: [] Yes [x] No    If the office needs to give you a call back, can they leave a voicemail: [] Yes [x] No    Ruben Monsivais Rep   11/16/23 12:30 EST     "

## 2023-11-17 RX ORDER — HYDROCHLOROTHIAZIDE 25 MG/1
25 TABLET ORAL
Qty: 30 TABLET | Refills: 2 | OUTPATIENT
Start: 2023-11-17 | End: 2024-02-15

## 2023-11-30 ENCOUNTER — OFFICE VISIT (OUTPATIENT)
Dept: FAMILY MEDICINE CLINIC | Facility: CLINIC | Age: 68
End: 2023-11-30
Payer: COMMERCIAL

## 2023-11-30 VITALS
SYSTOLIC BLOOD PRESSURE: 138 MMHG | DIASTOLIC BLOOD PRESSURE: 72 MMHG | RESPIRATION RATE: 18 BRPM | HEART RATE: 87 BPM | OXYGEN SATURATION: 99 % | WEIGHT: 238 LBS | BODY MASS INDEX: 31.54 KG/M2 | HEIGHT: 73 IN

## 2023-11-30 DIAGNOSIS — I10 PRIMARY HYPERTENSION: Primary | ICD-10-CM

## 2023-11-30 DIAGNOSIS — E66.09 CLASS 1 OBESITY DUE TO EXCESS CALORIES WITH SERIOUS COMORBIDITY AND BODY MASS INDEX (BMI) OF 33.0 TO 33.9 IN ADULT: ICD-10-CM

## 2023-11-30 PROCEDURE — 99213 OFFICE O/P EST LOW 20 MIN: CPT | Performed by: PSYCHOLOGIST

## 2023-11-30 NOTE — ASSESSMENT & PLAN NOTE
Hypertension is improving with treatment.  Continue current treatment regimen.  Dietary sodium restriction.  Weight loss.  Regular aerobic exercise.  Blood pressure will be reassessed at the next regular appointment.     Stop puffing cigars/ he stopped amlodipine bp went down too low-- will maintain with losartan/HCTZ

## 2023-11-30 NOTE — PROGRESS NOTES
"Chief Complaint  Follow-up (6 Week follow up to recheck Hypertension, Obesity. Needs wegovy uped and sent as a 90 day supply./)    Subjective        Barrie Rose presents to [facility] PRIMARY [facility]ARE  C/o follow up chronic illness 2. Med refill  Hypertension  This is a chronic problem. The current episode started more than 1 year ago. The problem has been resolved since onset. The problem is controlled. Pertinent negatives include no chest pain, headaches, palpitations or shortness of breath. Risk factors for coronary artery disease include obesity and male gender. Past treatments include lifestyle changes and angiotensin blockers. Current antihypertension treatment includes lifestyle changes and angiotensin blockers. The current treatment provides moderate improvement. There are no compliance problems.  There is no history of angina, kidney disease or CAD/MI. There is no history of chronic renal disease or a thyroid problem.   Obesity  This is a chronic problem. The current episode started more than 1 year ago. The problem occurs constantly. The problem has been gradually improving. Pertinent negatives include no chest pain, fatigue, fever or headaches. He has tried walking for the symptoms. The treatment provided moderate relief.       Objective   Vital Signs:  /72   Pulse 87   Resp 18   Ht 185.4 cm (73\")   Wt 108 kg (238 lb)   SpO2 99%   BMI 31.40 kg/m²   Estimated body mass index is 31.4 kg/m² as calculated from the following:    Height as of this encounter: 185.4 cm (73\").    Weight as of this encounter: 108 kg (238 lb).          Physical Exam  Vitals and nursing note reviewed.   Constitutional:       Appearance: Normal appearance. He is obese.   HENT:      Head: Normocephalic.      Right Ear: Tympanic membrane normal.      Left Ear: Tympanic membrane normal.      Nose: Nose normal.      Mouth/Throat:      Mouth: Mucous membranes are moist.   Eyes:      Extraocular Movements: " Extraocular movements intact.      Pupils: Pupils are equal, round, and reactive to light.   Cardiovascular:      Rate and Rhythm: Normal rate and regular rhythm.      Pulses: Normal pulses.      Heart sounds: Normal heart sounds.   Pulmonary:      Effort: Pulmonary effort is normal.      Breath sounds: Normal breath sounds.   Abdominal:      General: Abdomen is protuberant. Bowel sounds are normal.      Palpations: Abdomen is soft.   Musculoskeletal:         General: Normal range of motion.      Cervical back: Normal range of motion and neck supple.   Skin:     General: Skin is warm.      Capillary Refill: Capillary refill takes less than 2 seconds.   Neurological:      General: No focal deficit present.      Mental Status: He is alert and oriented to person, place, and time.   Psychiatric:         Mood and Affect: Mood normal.        Result Review :  The following data was reviewed by: Oseas Lam MD on 11/30/2023:  Common labs          5/16/2023    11:14 8/24/2023    08:34   Common Labs   Glucose 105  96    BUN 21  19    Creatinine 1.08  1.00    Sodium 142  139    Potassium 4.1  4.1    Chloride 104  103    Calcium 9.5  9.1    Albumin 4.3  4.3    Total Bilirubin 0.7  0.5    Alkaline Phosphatase 84  79    AST (SGOT) 25  23    ALT (SGPT) 29  30    WBC  8.66    Hemoglobin  15.2    Hematocrit  45.7    Platelets  190    Total Cholesterol 136  133    Triglycerides 140  129    HDL Cholesterol 55  60    LDL Cholesterol  57  51    Hemoglobin A1C  5.70    PSA  0.419    Uric Acid  5.1      Data reviewed : Radiologic studies 10/20/22 GB US           Assessment and Plan   Diagnoses and all orders for this visit:    1. Primary hypertension (Primary)  Assessment & Plan:  Hypertension is improving with treatment.  Continue current treatment regimen.  Dietary sodium restriction.  Weight loss.  Regular aerobic exercise.  Blood pressure will be reassessed at the next regular appointment.     Stop puffing cigars/ he stopped  amlodipine bp went down too low-- will maintain with losartan/HCTZ       2. Class 1 obesity due to excess calories with serious comorbidity and body mass index (BMI) of 33.0 to 33.9 in adult         I spent 20 minutes caring for Barrie on this date of service. This time includes time spent by me in the following activities:reviewing tests, obtaining and/or reviewing a separately obtained history, performing a medically appropriate examination and/or evaluation , counseling and educating the patient/family/caregiver, ordering medications, tests, or procedures, and documenting information in the medical record       Follow Up   Return in about 3 months (around 2/29/2024) for Recheck, RTC FASTING LABs-- change dose  med Obesity .  Patient was given instructions and counseling regarding his condition or for health maintenance advice. Please see specific information pulled into the AVS if appropriate.           This document has been electronically signed by Oseas Lam MD  November 30, 2023 11:02 EST

## 2023-12-04 ENCOUNTER — TELEPHONE (OUTPATIENT)
Dept: FAMILY MEDICINE CLINIC | Facility: CLINIC | Age: 68
End: 2023-12-04

## 2023-12-04 NOTE — TELEPHONE ENCOUNTER
"  Caller: Barrie Rose \"OFELIA\"    Relationship: Self    Best call back number: [phone]    What is the best time to reach you: ANYTIME     Who are you requesting to speak with (clinical staff, provider,  specific staff member): CLINICAL STAFF    What was the call regarding: PATIENT STATES THAT [facility] IS NEEDING MORE INFORMATION ON THE PRESCRIPTION OF OZEMPIC THAT WAS CALLED IN FOR HIM.     Is it okay if the provider responds through MyChart: YES  "

## 2023-12-05 NOTE — TELEPHONE ENCOUNTER
Please correct script for patient - needs to be wegovy.     Patient wont need a prior authorization if its changed to wegovy due to already having an active auth

## 2023-12-05 NOTE — TELEPHONE ENCOUNTER
"Caller: Barrie Rose \"OFELIA\"    Relationship to patient: Self    Best call back number: [phone]     PATIENT IS CALLING [facility].      "

## 2023-12-06 RX ORDER — SEMAGLUTIDE 1 MG/.5ML
1 INJECTION, SOLUTION SUBCUTANEOUS WEEKLY
Qty: 2 ML | Refills: 2 | Status: SHIPPED | OUTPATIENT
Start: 2023-12-06 | End: 2024-02-22

## 2023-12-18 RX ORDER — MELOXICAM 7.5 MG/1
TABLET ORAL
Qty: 90 TABLET | OUTPATIENT
Start: 2023-12-18

## 2023-12-21 RX ORDER — CELECOXIB 200 MG/1
200 CAPSULE ORAL
Qty: 90 CAPSULE | Refills: 1 | Status: SHIPPED | OUTPATIENT
Start: 2023-12-21 | End: 2024-06-18

## 2024-02-05 RX ORDER — LOSARTAN POTASSIUM AND HYDROCHLOROTHIAZIDE 12.5; 1 MG/1; MG/1
1 TABLET ORAL
Qty: 90 TABLET | Refills: 0 | Status: SHIPPED | OUTPATIENT
Start: 2024-02-05

## 2024-02-26 ENCOUNTER — OFFICE VISIT (OUTPATIENT)
Dept: FAMILY MEDICINE CLINIC | Facility: CLINIC | Age: 69
End: 2024-02-26
Payer: COMMERCIAL

## 2024-02-26 VITALS
DIASTOLIC BLOOD PRESSURE: 86 MMHG | HEIGHT: 73 IN | RESPIRATION RATE: 18 BRPM | BODY MASS INDEX: 31.73 KG/M2 | TEMPERATURE: 97.8 F | WEIGHT: 239.4 LBS | SYSTOLIC BLOOD PRESSURE: 142 MMHG | OXYGEN SATURATION: 96 % | HEART RATE: 85 BPM

## 2024-02-26 DIAGNOSIS — K21.00 GASTROESOPHAGEAL REFLUX DISEASE WITH ESOPHAGITIS WITHOUT HEMORRHAGE: ICD-10-CM

## 2024-02-26 DIAGNOSIS — E78.2 MIXED HYPERLIPIDEMIA: ICD-10-CM

## 2024-02-26 DIAGNOSIS — E66.09 CLASS 1 OBESITY DUE TO EXCESS CALORIES WITH SERIOUS COMORBIDITY AND BODY MASS INDEX (BMI) OF 31.0 TO 31.9 IN ADULT: ICD-10-CM

## 2024-02-26 DIAGNOSIS — I10 PRIMARY HYPERTENSION: Primary | ICD-10-CM

## 2024-02-26 PROBLEM — N28.1 SIMPLE RENAL CYST: Status: ACTIVE | Noted: 2024-02-26

## 2024-02-26 PROBLEM — H93.19 TINNITUS: Status: ACTIVE | Noted: 2024-02-26

## 2024-02-26 PROBLEM — C44.300 PRIMARY MALIGNANT NEOPLASM OF SKIN OF FACE: Status: ACTIVE | Noted: 2024-02-26

## 2024-02-26 PROBLEM — M77.30 CALCANEAL SPUR: Status: ACTIVE | Noted: 2024-02-26

## 2024-02-26 PROBLEM — G56.20 ULNAR NEUROPATHY: Status: ACTIVE | Noted: 2024-02-26

## 2024-02-26 LAB
ALBUMIN SERPL-MCNC: 4.5 G/DL (ref 3.5–5.2)
ALBUMIN/GLOB SERPL: 1.6 G/DL
ALP SERPL-CCNC: 80 U/L (ref 39–117)
ALT SERPL W P-5'-P-CCNC: 39 U/L (ref 1–41)
ANION GAP SERPL CALCULATED.3IONS-SCNC: 11.2 MMOL/L (ref 5–15)
AST SERPL-CCNC: 27 U/L (ref 1–40)
BASOPHILS # BLD AUTO: 0.04 10*3/MM3 (ref 0–0.2)
BASOPHILS NFR BLD AUTO: 0.5 % (ref 0–1.5)
BILIRUB SERPL-MCNC: 0.5 MG/DL (ref 0–1.2)
BUN SERPL-MCNC: 19 MG/DL (ref 8–23)
BUN/CREAT SERPL: 22.4 (ref 7–25)
CALCIUM SPEC-SCNC: 9.4 MG/DL (ref 8.6–10.5)
CHLORIDE SERPL-SCNC: 101 MMOL/L (ref 98–107)
CHOLEST SERPL-MCNC: 147 MG/DL (ref 0–200)
CO2 SERPL-SCNC: 28.8 MMOL/L (ref 22–29)
CREAT SERPL-MCNC: 0.85 MG/DL (ref 0.76–1.27)
DEPRECATED RDW RBC AUTO: 45 FL (ref 37–54)
EGFRCR SERPLBLD CKD-EPI 2021: 94.1 ML/MIN/1.73
EOSINOPHIL # BLD AUTO: 0.26 10*3/MM3 (ref 0–0.4)
EOSINOPHIL NFR BLD AUTO: 3 % (ref 0.3–6.2)
ERYTHROCYTE [DISTWIDTH] IN BLOOD BY AUTOMATED COUNT: 12.9 % (ref 12.3–15.4)
GLOBULIN UR ELPH-MCNC: 2.9 GM/DL
GLUCOSE SERPL-MCNC: 87 MG/DL (ref 65–99)
HBA1C MFR BLD: 5.1 % (ref 4.8–5.6)
HCT VFR BLD AUTO: 48.3 % (ref 37.5–51)
HDLC SERPL-MCNC: 68 MG/DL (ref 40–60)
HGB BLD-MCNC: 15.9 G/DL (ref 13–17.7)
IMM GRANULOCYTES # BLD AUTO: 0.04 10*3/MM3 (ref 0–0.05)
IMM GRANULOCYTES NFR BLD AUTO: 0.5 % (ref 0–0.5)
LDLC SERPL CALC-MCNC: 56 MG/DL (ref 0–100)
LDLC/HDLC SERPL: 0.76 {RATIO}
LYMPHOCYTES # BLD AUTO: 2.65 10*3/MM3 (ref 0.7–3.1)
LYMPHOCYTES NFR BLD AUTO: 30.6 % (ref 19.6–45.3)
MCH RBC QN AUTO: 31.2 PG (ref 26.6–33)
MCHC RBC AUTO-ENTMCNC: 32.9 G/DL (ref 31.5–35.7)
MCV RBC AUTO: 94.9 FL (ref 79–97)
MONOCYTES # BLD AUTO: 0.77 10*3/MM3 (ref 0.1–0.9)
MONOCYTES NFR BLD AUTO: 8.9 % (ref 5–12)
NEUTROPHILS NFR BLD AUTO: 4.9 10*3/MM3 (ref 1.7–7)
NEUTROPHILS NFR BLD AUTO: 56.5 % (ref 42.7–76)
NRBC BLD AUTO-RTO: 0 /100 WBC (ref 0–0.2)
PLATELET # BLD AUTO: 198 10*3/MM3 (ref 140–450)
PMV BLD AUTO: 10.9 FL (ref 6–12)
POTASSIUM SERPL-SCNC: 4.1 MMOL/L (ref 3.5–5.2)
PROT SERPL-MCNC: 7.4 G/DL (ref 6–8.5)
RBC # BLD AUTO: 5.09 10*6/MM3 (ref 4.14–5.8)
SODIUM SERPL-SCNC: 141 MMOL/L (ref 136–145)
TRIGL SERPL-MCNC: 138 MG/DL (ref 0–150)
VLDLC SERPL-MCNC: 23 MG/DL (ref 5–40)
WBC NRBC COR # BLD AUTO: 8.66 10*3/MM3 (ref 3.4–10.8)

## 2024-02-26 PROCEDURE — 85025 COMPLETE CBC W/AUTO DIFF WBC: CPT | Performed by: PSYCHOLOGIST

## 2024-02-26 PROCEDURE — 82306 VITAMIN D 25 HYDROXY: CPT | Performed by: PSYCHOLOGIST

## 2024-02-26 PROCEDURE — 99213 OFFICE O/P EST LOW 20 MIN: CPT | Performed by: PSYCHOLOGIST

## 2024-02-26 PROCEDURE — 80053 COMPREHEN METABOLIC PANEL: CPT | Performed by: PSYCHOLOGIST

## 2024-02-26 PROCEDURE — 83036 HEMOGLOBIN GLYCOSYLATED A1C: CPT | Performed by: PSYCHOLOGIST

## 2024-02-26 PROCEDURE — 80061 LIPID PANEL: CPT | Performed by: PSYCHOLOGIST

## 2024-02-26 RX ORDER — SILDENAFIL 100 MG/1
100 TABLET, FILM COATED ORAL AS NEEDED
COMMUNITY

## 2024-02-26 RX ORDER — SEMAGLUTIDE 1.7 MG/.75ML
1.7 INJECTION, SOLUTION SUBCUTANEOUS WEEKLY
Qty: 3 ML | Refills: 2 | Status: SHIPPED | OUTPATIENT
Start: 2024-02-26 | End: 2024-05-14

## 2024-02-26 RX ORDER — CYCLOBENZAPRINE HCL 5 MG
5 TABLET ORAL AS NEEDED
COMMUNITY

## 2024-02-26 RX ORDER — ALBUTEROL SULFATE 90 UG/1
2 AEROSOL, METERED RESPIRATORY (INHALATION) EVERY 6 HOURS PRN
COMMUNITY

## 2024-02-26 RX ORDER — METOPROLOL TARTRATE 50 MG/1
1 TABLET, FILM COATED ORAL DAILY
COMMUNITY

## 2024-02-26 NOTE — PROGRESS NOTES
"PLEASE CALL PS: RE LAB RESULTS \" DR KRAUSE REVIEWED YOUR LAB RESULTS AND NON CRITICAL VALUES   IF YOU HAVE ANY QUESTIONS TO MEHNAZ HER BACK\"  REASSURE PATENT AND REMIND HIM PF HIS NEXT APPT. TY"

## 2024-02-26 NOTE — ASSESSMENT & PLAN NOTE
Hypertension is stable and controlled  Continue current treatment regimen.  Dietary sodium restriction.  Weight loss.  Regular aerobic exercise.  Blood pressure will be reassessed in 6 months.

## 2024-02-26 NOTE — ASSESSMENT & PLAN NOTE
Patient's (Body mass index is 31.59 kg/m².) indicates that they are obese (BMI >30) with health conditions that include obstructive sleep apnea, hypertension, coronary heart disease, diabetes mellitus, dyslipidemias, GERD, idiopathic intracranial hypertension, and osteoarthritis . Weight is improving with treatment. BMI  is above average; BMI management plan is completed. We discussed portion control, increasing exercise, and joining a fitness center or start home based exercise program.

## 2024-02-26 NOTE — PROGRESS NOTES
Chief Complaint  Follow-up (Cont. Care of HTN, Obesity. /Patient also c/o headache, sharp pain on top of head. Onset x1 day)    Subjective        Barrie Rose presents to [facility]  C./o follow up chronic illness 2. Headaches has been since yesterday/ maybe allergies   Hypertension  This is a chronic problem. The current episode started more than 1 year ago. The problem is uncontrolled. Pertinent negatives include no chest pain, headaches, palpitations or shortness of breath. Risk factors for coronary artery disease include obesity, male gender and dyslipidemia. Past treatments include lifestyle changes, diuretics and angiotensin blockers. Current antihypertension treatment includes lifestyle changes, diuretics and angiotensin blockers. The current treatment provides mild improvement. There are no compliance problems.  There is no history of angina, kidney disease or CAD/MI. There is no history of chronic renal disease or a thyroid problem.   Hyperlipidemia  This is a chronic problem. The current episode started more than 1 year ago. The problem is uncontrolled. Recent lipid tests were reviewed and are high. Exacerbating diseases include obesity. He has no history of chronic renal disease. Pertinent negatives include no chest pain, focal sensory loss, leg pain or shortness of breath. Current antihyperlipidemic treatment includes statins. The current treatment provides mild improvement of lipids. There are no compliance problems.  Risk factors for coronary artery disease include obesity, male sex and hypertension.   Obesity  This is a chronic problem. The current episode started more than 1 year ago. The problem occurs constantly. The problem has been gradually improving. Pertinent negatives include no chest pain, fatigue, fever or headaches. He has tried walking for the symptoms. The treatment provided moderate relief.       Objective   Vital Signs:  /86   Pulse 85   Temp  "97.8 °F (36.6 °C) (Temporal)   Resp 18   Ht 185.4 cm (73\")   Wt 109 kg (239 lb 6.4 oz)   SpO2 96%   BMI 31.59 kg/m²   Estimated body mass index is 31.59 kg/m² as calculated from the following:    Height as of this encounter: 185.4 cm (73\").    Weight as of this encounter: 109 kg (239 lb 6.4 oz).            Physical Exam  Vitals and nursing note reviewed.   Constitutional:       Appearance: Normal appearance. He is obese.   HENT:      Head: Normocephalic.      Right Ear: Tympanic membrane normal.      Left Ear: Tympanic membrane normal.      Nose: Nose normal.      Mouth/Throat:      Mouth: Mucous membranes are moist.   Eyes:      Extraocular Movements: Extraocular movements intact.      Pupils: Pupils are equal, round, and reactive to light.   Cardiovascular:      Rate and Rhythm: Normal rate and regular rhythm.      Pulses: Normal pulses.      Heart sounds: Normal heart sounds.   Pulmonary:      Effort: Pulmonary effort is normal.      Breath sounds: Normal breath sounds.   Abdominal:      General: Abdomen is protuberant. Bowel sounds are normal.      Palpations: Abdomen is soft.   Musculoskeletal:         General: Normal range of motion.      Cervical back: Normal range of motion and neck supple.   Skin:     General: Skin is warm.      Capillary Refill: Capillary refill takes less than 2 seconds.   Neurological:      General: No focal deficit present.      Mental Status: He is alert and oriented to person, place, and time.   Psychiatric:         Mood and Affect: Mood normal.        Result Review :  The following data was reviewed by: Oseas Lam MD on 02/26/2024:  Common labs          5/16/2023    11:14 8/24/2023    08:34   Common Labs   Glucose 105  96    BUN 21  19    Creatinine 1.08  1.00    Sodium 142  139    Potassium 4.1  4.1    Chloride 104  103    Calcium 9.5  9.1    Albumin 4.3  4.3    Total Bilirubin 0.7  0.5    Alkaline Phosphatase 84  79    AST (SGOT) 25  23    ALT (SGPT) 29  30    WBC  " 8.66    Hemoglobin  15.2    Hematocrit  45.7    Platelets  190    Total Cholesterol 136  133    Triglycerides 140  129    HDL Cholesterol 55  60    LDL Cholesterol  57  51    Hemoglobin A1C  5.70    PSA  0.419    Uric Acid  5.1      Data reviewed : Radiologic studies 10/20/23 [facility]           Assessment and Plan   Diagnoses and all orders for this visit:    1. Primary hypertension (Primary)  Assessment & Plan:  Hypertension is stable and controlled  Continue current treatment regimen.  Dietary sodium restriction.  Weight loss.  Regular aerobic exercise.  Blood pressure will be reassessed in 6 months.    Orders:  -     CBC & Differential  -     Comprehensive Metabolic Panel  -     Hemoglobin A1c  -     Lipid Panel  -     Vitamin D,25-Hydroxy    2. Mixed hyperlipidemia  Assessment & Plan:   Lipid abnormalities are worsening    Plan:  Continue same medication/s without change.      Discussed medication dosage, use, side effects, and goals of treatment in detail.    Counseled patient on lifestyle modifications to help control hyperlipidemia.     Patient Treatment Goals:   LDL goal is less than 70    Followup at the next regular appointment.    Orders:  -     CBC & Differential  -     Comprehensive Metabolic Panel  -     Hemoglobin A1c  -     Lipid Panel  -     Vitamin D,25-Hydroxy    3. Gastroesophageal reflux disease with esophagitis without hemorrhage  -     CBC & Differential  -     Comprehensive Metabolic Panel  -     Hemoglobin A1c  -     Lipid Panel  -     Vitamin D,25-Hydroxy    4. Class 1 obesity due to excess calories with serious comorbidity and body mass index (BMI) of 31.0 to 31.9 in adult  Assessment & Plan:  Patient's (Body mass index is 31.59 kg/m².) indicates that they are obese (BMI >30) with health conditions that include obstructive sleep apnea, hypertension, coronary heart disease, diabetes mellitus, dyslipidemias, GERD, idiopathic intracranial hypertension, and osteoarthritis . Weight is improving  with treatment. BMI  is above average; BMI management plan is completed. We discussed portion control, increasing exercise, and joining a fitness center or start home based exercise program.     Orders:  -     CBC & Differential  -     Comprehensive Metabolic Panel  -     Hemoglobin A1c  -     Lipid Panel  -     Vitamin D,25-Hydroxy    Other orders  -     Semaglutide-Weight Management (Wegovy) 1.7 MG/0.75ML solution auto-injector; Inject 0.75 mL under the skin into the appropriate area as directed 1 (One) Time Per Week for 12 doses.  Dispense: 3 mL; Refill: 2           I spent 30 minutes caring for Barrie on this date of service. This time includes time spent by me in the following activities:reviewing tests, obtaining and/or reviewing a separately obtained history, performing a medically appropriate examination and/or evaluation , counseling and educating the patient/family/caregiver, ordering medications, tests, or procedures, and documenting information in the medical record       Follow Up   Return in about 6 months (around 8/27/2024) for Annual physical, RTC FASTING LABS & 3 mos 5/26/24 for ff/up obesity change in dose.  Patient was given instructions and counseling regarding his condition or for health maintenance advice. Please see specific information pulled into the AVS if appropriate.           This document has been electronically signed by Oseas Lam MD  February 26, 2024 08:54 EST

## 2024-02-26 NOTE — ASSESSMENT & PLAN NOTE
Lipid abnormalities are worsening    Plan:  Continue same medication/s without change.      Discussed medication dosage, use, side effects, and goals of treatment in detail.    Counseled patient on lifestyle modifications to help control hyperlipidemia.     Patient Treatment Goals:   LDL goal is less than 70    Followup at the next regular appointment.

## 2024-02-27 LAB — 25(OH)D3 SERPL-MCNC: 40.5 NG/ML (ref 30–100)

## 2024-03-12 ENCOUNTER — PRIOR AUTHORIZATION (OUTPATIENT)
Dept: FAMILY MEDICINE CLINIC | Facility: CLINIC | Age: 69
End: 2024-03-12
Payer: COMMERCIAL

## 2024-03-12 NOTE — TELEPHONE ENCOUNTER
"Medication Approved:   Valid: 03/12/2024 Through 03/12/2025   As long as you stay enrolled in your current health plan.    Current  Weight (**Date**):  Weight: 239lbs  Height: 73\"  BMI: 31.59     Diagnosis ICD 10 Used:  BMI: Z68.31  BMI Class: E66.01    "

## 2024-05-06 RX ORDER — LOSARTAN POTASSIUM AND HYDROCHLOROTHIAZIDE 12.5; 1 MG/1; MG/1
1 TABLET ORAL
Qty: 90 TABLET | Refills: 0 | Status: SHIPPED | OUTPATIENT
Start: 2024-05-06

## 2024-05-13 ENCOUNTER — OFFICE VISIT (OUTPATIENT)
Dept: CARDIOLOGY | Facility: CLINIC | Age: 69
End: 2024-05-13
Payer: COMMERCIAL

## 2024-05-13 VITALS
WEIGHT: 241 LBS | HEIGHT: 73 IN | DIASTOLIC BLOOD PRESSURE: 80 MMHG | BODY MASS INDEX: 31.94 KG/M2 | SYSTOLIC BLOOD PRESSURE: 140 MMHG | HEART RATE: 64 BPM

## 2024-05-13 DIAGNOSIS — I10 PRIMARY HYPERTENSION: Primary | ICD-10-CM

## 2024-05-13 DIAGNOSIS — R07.89 CHEST TIGHTNESS: ICD-10-CM

## 2024-05-13 DIAGNOSIS — E88.810 METABOLIC SYNDROME: ICD-10-CM

## 2024-05-13 DIAGNOSIS — C44.91 BASAL CELL CARCINOMA (BCC), UNSPECIFIED SITE: ICD-10-CM

## 2024-05-13 DIAGNOSIS — E78.00 HYPERCHOLESTEROLEMIA: ICD-10-CM

## 2024-05-13 PROBLEM — E78.2 MIXED HYPERLIPIDEMIA: Status: RESOLVED | Noted: 2022-11-14 | Resolved: 2024-05-13

## 2024-05-13 PROCEDURE — 99214 OFFICE O/P EST MOD 30 MIN: CPT | Performed by: INTERNAL MEDICINE

## 2024-05-13 RX ORDER — AMLODIPINE BESYLATE 5 MG/1
5 TABLET ORAL DAILY
Qty: 90 TABLET | Refills: 3 | Status: SHIPPED | OUTPATIENT
Start: 2024-05-13

## 2024-05-13 NOTE — TELEPHONE ENCOUNTER
Rx Refill Note  Requested Prescriptions     Pending Prescriptions Disp Refills    atorvastatin (LIPITOR) 20 MG tablet [Pharmacy Med Name: ATORVASTATIN 20 MG TABLET] 90 tablet      Sig: TAKE 1 TABLET BY MOUTH EVERY NIGHT AT BEDTIME      Last office visit with prescribing clinician: 2/26/2024   Last telemedicine visit with prescribing clinician: Visit date not found   Next office visit with prescribing clinician: 6/3/2024                         Would you like a call back once the refill request has been completed: [] Yes [] No    If the office needs to give you a call back, can they leave a voicemail: [] Yes [] No    Mariaelena Mcleod CMA  05/13/24, 16:51 EDT

## 2024-05-13 NOTE — PROGRESS NOTES
Chief Complaint   Patient presents with   • Follow-up     For cardiac management, doing well. Denies any cardiac problems.    • Med Refill     Refills needed on amlodipine, 90 days to [facility].    • Labs     Most recent labs from Feb in chart       CARDIAC COMPLAINTS  Cardiac management came in today for his regular follow-up visit.  He has history of hypertension, hypercholesterolemia and a strong family history of premature coronary artery disease.        Subjective   Barrie Rose is a 68 y.o. male he was referred for increasing chest tightness and shortness of breath.  This happened while he was watching football on a Saturday.  He also has been noticing shortness of breath mostly on exertion.  He was put on aspirin and then underwent few investigation.  He stress test showed mild hypertensive blood pressure response with changes in the inferior wall which seems to be diaphragmatic attenuation.  His echocardiogram did not show any acute changes except that his aorta was upper limit of normal.  He was put on amlodipine after which he is doing much better.  He hardly has any more chest pain.  Yesterday he had his dinner very late at night and also ate from [employer] with a high salt intake.              Cardiac History  Past Surgical History:   Procedure Laterality Date   • APPENDECTOMY     • CARDIOVASCULAR STRESS TEST  10/02/2023    4.50 Min. 7.0 METS. 88% THR. 196/73. EF 63%. Inferior Infarct Vs Diaphragmatic attenuation   • COLONOSCOPY     • CYST REMOVAL Left 01/18/2023    BACK OF LEFT LEG   • ECHO - CONVERTED  10/02/2023    TLS. EF 65%. Trace-Mild MR. AO- 3.9. RVSP- 12 mmHg   • VASECTOMY         Current Outpatient Medications   Medication Sig Dispense Refill   • albuterol sulfate  (90 Base) MCG/ACT inhaler Inhale 2 puffs Every 6 (Six) Hours As Needed.     • allopurinol (ZYLOPRIM) 100 MG tablet TAKE 1 TABLET BY MOUTH DAILY WITH BREAKFAST 30 tablet 5   • amLODIPine (NORVASC) 5 MG tablet Take 1 tablet  by mouth Daily. 90 tablet 3   • Aspirin-Caffeine 845-65 MG pack Take  by mouth As Needed.     • atorvastatin (LIPITOR) 20 MG tablet TAKE 1 TABLET BY MOUTH DAILY AT NIGHT 30 tablet 5   • celecoxib (CeleBREX) 200 MG capsule Take 1 capsule by mouth Daily With Lunch for 180 days. 90 capsule 1   • cyclobenzaprine (FLEXERIL) 5 MG tablet Take 1 tablet by mouth As Needed.     • loratadine (CLARITIN) 10 MG tablet Take 1 tablet by mouth As Needed.     • losartan-hydrochlorothiazide (HYZAAR) 100-12.5 MG per tablet TAKE 1 TABLET BY MOUTH DAILY WITH BREAKFAST 90 tablet 0   • metoprolol tartrate (LOPRESSOR) 50 MG tablet Take 1 tablet by mouth Daily.     • Semaglutide-Weight Management (Wegovy) 1.7 MG/0.75ML solution auto-injector Inject 0.75 mL under the skin into the appropriate area as directed 1 (One) Time Per Week for 12 doses. 3 mL 2   • sildenafil (Viagra) 100 MG tablet Take 1 tablet by mouth As Needed.       No current facility-administered medications for this visit.       Allergies  :  Bee venom       Past Medical History:   Diagnosis Date   • Cancer     hx of skin cancer   • Gout    • Hypertension        Social History     Socioeconomic History   • Marital status:    Tobacco Use   • Smoking status: Every Day     Types: Cigars     Start date: 1970     Passive exposure: Current   • Smokeless tobacco: Never   Vaping Use   • Vaping status: Never Used   Substance and Sexual Activity   • Alcohol use: Yes     Alcohol/week: 10.0 standard drinks of alcohol     Types: 10 Shots of liquor per week   • Drug use: Never   • Sexual activity: Yes     Partners: Female     Birth control/protection: None       Family History   Problem Relation Age of Onset   • No Known Problems Mother    • Heart attack Father         11 stents   • Cancer Father    • Heart disease Father    • Hypertension Sister    • Heart disease Paternal Grandmother    • No Known Problems Son        Review of Systems   Constitutional: Negative for decreased  "appetite and malaise/fatigue.   HENT:  Negative for congestion and sore throat.    Eyes:  Negative for blurred vision, double vision and visual disturbance.   Cardiovascular:  Negative for chest pain and palpitations.   Respiratory:  Negative for shortness of breath and snoring.    Endocrine: Negative for cold intolerance and heat intolerance.   Hematologic/Lymphatic: Negative for adenopathy. Does not bruise/bleed easily.   Skin:  Negative for itching, nail changes and skin cancer.   Musculoskeletal:  Negative for arthritis and myalgias.   Gastrointestinal:  Negative for abdominal pain, dysphagia and heartburn.   Genitourinary:  Negative for bladder incontinence and frequency.   Neurological:  Negative for dizziness, seizures and vertigo.   Psychiatric/Behavioral:  Negative for altered mental status.    Allergic/Immunologic: Negative for environmental allergies and hives.     Diabetes- No  Thyroid- normal    Objective     /80   Pulse 64   Ht 185.4 cm (73\")   Wt 109 kg (241 lb)   BMI 31.80 kg/m²     Vitals and nursing note reviewed.   Constitutional:       Appearance: Healthy appearance. Not in distress.   Eyes:      Conjunctiva/sclera: Conjunctivae normal.      Pupils: Pupils are equal, round, and reactive to light.   HENT:      Head: Normocephalic.   Pulmonary:      Effort: Pulmonary effort is normal.      Breath sounds: Normal breath sounds.   Cardiovascular:      PMI at left midclavicular line. Normal rate. Regular rhythm.   Abdominal:      General: Bowel sounds are normal.      Palpations: Abdomen is soft.   Musculoskeletal: Normal range of motion.      Cervical back: Normal range of motion and neck supple. Skin:     General: Skin is warm and dry.   Neurological:      Mental Status: Alert, oriented to person, place, and time and oriented to person, place and time.   Procedures            @ASSESSMENT/PLAN@        Diagnoses and all orders for this visit:    1. Primary hypertension (Primary)  -     " amLODIPine (NORVASC) 5 MG tablet; Take 1 tablet by mouth Daily.  Dispense: 90 tablet; Refill: 3    2. Hypercholesterolemia    3. Basal cell carcinoma (BCC), unspecified site    4. Metabolic syndrome    5. Chest tightness       At baseline his heart rate is stable.  His blood pressure is upper limit of normal.  Repeat blood pressure showed it did come down by about 10 points.  His cardiovascular examination is otherwise unremarkable.  His BMI is still around 32.    Regarding his hypertension, he seems to be better controlled after adding amlodipine to Hyzaar and metoprolol.  Continue the same and prescription was given.  He need his labs rechecked again in 3 to 4 months.  I had a long talk with him again about diet especially cutting down on the salt intake.  He is also advised to have his blood pressure monitored more closely at home.    Regarding his history of hypercholesterolemia, he is on moderate dose of Lipitor.  Need to recheck his lipid profile along with LFT.    Regarding his skin cancer, he has undergone multiple surgeries to remove those lesion.  He is being followed by the dermatologist    Regarding metabolic syndrome, he has been taking Wegovy for about 4 months.  He claims that he lost about 20 pounds but from February it looks like only 10 pounds.  I talked to him about diet especially low-carb diet    Regarding his chest tightness, it seems to be secondary to his hypertension.  Since it is better controlled he is feeling better also.    Regarding advanced directive, talked with him about living will and power of .  I gave him booklet regarding that    Overall cardiac status appears stable.  I will see him back in 6 months or sooner if needed                  Electronically signed by Desiree Collazo MD May 13, 2024 10:39 EDT

## 2024-05-13 NOTE — LETTER
May 13, 2024       No Recipients    Patient: Barrie Rose   YOB: 1955   Date of Visit: 5/13/2024     Dear Oseas Lam MD:       Thank you for referring Barrie Rose to me for evaluation. Below are the relevant portions of my assessment and plan of care.    If you have questions, please do not hesitate to call me. I look forward to following Barrie along with you.         Sincerely,        Desiree Collazo MD        CC:   No Recipients    Desiree Collazo MD  05/13/24 1044  Sign when Signing Visit  Chief Complaint   Patient presents with   • Follow-up     For cardiac management, doing well. Denies any cardiac problems.    • Med Refill     Refills needed on amlodipine, 90 days to [facility].    • Labs     Most recent labs from Feb in chart       CARDIAC COMPLAINTS  Cardiac management came in today for his regular follow-up visit.  He has history of hypertension, hypercholesterolemia and a strong family history of premature coronary artery disease.        Subjective  Barrie Rose is a 68 y.o. male he was referred for increasing chest tightness and shortness of breath.  This happened while he was watching football on a Saturday.  He also has been noticing shortness of breath mostly on exertion.  He was put on aspirin and then underwent few investigation.  He stress test showed mild hypertensive blood pressure response with changes in the inferior wall which seems to be diaphragmatic attenuation.  His echocardiogram did not show any acute changes except that his aorta was upper limit of normal.  He was put on amlodipine after which he is doing much better.  He hardly has any more chest pain.  Yesterday he had his dinner very late at night and also ate from Mexican with a high salt intake.              Cardiac History  Past Surgical History:   Procedure Laterality Date   • APPENDECTOMY     • CARDIOVASCULAR STRESS TEST  10/02/2023    4.50 Min. 7.0 METS. 88% THR. 196/73. EF 63%.  Inferior Infarct Vs Diaphragmatic attenuation   • COLONOSCOPY     • CYST REMOVAL Left 01/18/2023    BACK OF LEFT LEG   • ECHO - CONVERTED  10/02/2023    TLS. EF 65%. Trace-Mild MR. AO- 3.9. RVSP- 12 mmHg   • VASECTOMY         Current Outpatient Medications   Medication Sig Dispense Refill   • albuterol sulfate  (90 Base) MCG/ACT inhaler Inhale 2 puffs Every 6 (Six) Hours As Needed.     • allopurinol (ZYLOPRIM) 100 MG tablet TAKE 1 TABLET BY MOUTH DAILY WITH BREAKFAST 30 tablet 5   • amLODIPine (NORVASC) 5 MG tablet Take 1 tablet by mouth Daily. 90 tablet 3   • Aspirin-Caffeine 845-65 MG pack Take  by mouth As Needed.     • atorvastatin (LIPITOR) 20 MG tablet TAKE 1 TABLET BY MOUTH DAILY AT NIGHT 30 tablet 5   • celecoxib (CeleBREX) 200 MG capsule Take 1 capsule by mouth Daily With Lunch for 180 days. 90 capsule 1   • cyclobenzaprine (FLEXERIL) 5 MG tablet Take 1 tablet by mouth As Needed.     • loratadine (CLARITIN) 10 MG tablet Take 1 tablet by mouth As Needed.     • losartan-hydrochlorothiazide (HYZAAR) 100-12.5 MG per tablet TAKE 1 TABLET BY MOUTH DAILY WITH BREAKFAST 90 tablet 0   • metoprolol tartrate (LOPRESSOR) 50 MG tablet Take 1 tablet by mouth Daily.     • Semaglutide-Weight Management (Wegovy) 1.7 MG/0.75ML solution auto-injector Inject 0.75 mL under the skin into the appropriate area as directed 1 (One) Time Per Week for 12 doses. 3 mL 2   • sildenafil (Viagra) 100 MG tablet Take 1 tablet by mouth As Needed.       No current facility-administered medications for this visit.       Allergies  :  Bee venom       Past Medical History:   Diagnosis Date   • Cancer     hx of skin cancer   • Gout    • Hypertension        Social History     Socioeconomic History   • Marital status:    Tobacco Use   • Smoking status: Every Day     Types: Cigars     Start date: 1970     Passive exposure: Current   • Smokeless tobacco: Never   Vaping Use   • Vaping status: Never Used   Substance and Sexual Activity   •  "Alcohol use: Yes     Alcohol/week: 10.0 standard drinks of alcohol     Types: 10 Shots of liquor per week   • Drug use: Never   • Sexual activity: Yes     Partners: Female     Birth control/protection: None       Family History   Problem Relation Age of Onset   • No Known Problems Mother    • Heart attack Father         11 stents   • Cancer Father    • Heart disease Father    • Hypertension Sister    • Heart disease Paternal Grandmother    • No Known Problems Son        Review of Systems   Constitutional: Negative for decreased appetite and malaise/fatigue.   HENT:  Negative for congestion and sore throat.    Eyes:  Negative for blurred vision, double vision and visual disturbance.   Cardiovascular:  Negative for chest pain and palpitations.   Respiratory:  Negative for shortness of breath and snoring.    Endocrine: Negative for cold intolerance and heat intolerance.   Hematologic/Lymphatic: Negative for adenopathy. Does not bruise/bleed easily.   Skin:  Negative for itching, nail changes and skin cancer.   Musculoskeletal:  Negative for arthritis and myalgias.   Gastrointestinal:  Negative for abdominal pain, dysphagia and heartburn.   Genitourinary:  Negative for bladder incontinence and frequency.   Neurological:  Negative for dizziness, seizures and vertigo.   Psychiatric/Behavioral:  Negative for altered mental status.    Allergic/Immunologic: Negative for environmental allergies and hives.     Diabetes- No  Thyroid- normal    Objective    /80   Pulse 64   Ht 185.4 cm (73\")   Wt 109 kg (241 lb)   BMI 31.80 kg/m²     Vitals and nursing note reviewed.   Constitutional:       Appearance: Healthy appearance. Not in distress.   Eyes:      Conjunctiva/sclera: Conjunctivae normal.      Pupils: Pupils are equal, round, and reactive to light.   HENT:      Head: Normocephalic.   Pulmonary:      Effort: Pulmonary effort is normal.      Breath sounds: Normal breath sounds.   Cardiovascular:      PMI at left " midclavicular line. Normal rate. Regular rhythm.   Abdominal:      General: Bowel sounds are normal.      Palpations: Abdomen is soft.   Musculoskeletal: Normal range of motion.      Cervical back: Normal range of motion and neck supple. Skin:     General: Skin is warm and dry.   Neurological:      Mental Status: Alert, oriented to person, place, and time and oriented to person, place and time.   Procedures            @ASSESSMENT/PLAN@        Diagnoses and all orders for this visit:    1. Primary hypertension (Primary)  -     amLODIPine (NORVASC) 5 MG tablet; Take 1 tablet by mouth Daily.  Dispense: 90 tablet; Refill: 3    2. Hypercholesterolemia    3. Basal cell carcinoma (BCC), unspecified site    4. Metabolic syndrome    5. Chest tightness       At baseline his heart rate is stable.  His blood pressure is upper limit of normal.  Repeat blood pressure showed it did come down by about 10 points.  His cardiovascular examination is otherwise unremarkable.  His BMI is still around 32.    Regarding his hypertension, he seems to be better controlled after adding amlodipine to Hyzaar and metoprolol.  Continue the same and prescription was given.  He need his labs rechecked again in 3 to 4 months.  I had a long talk with him again about diet especially cutting down on the salt intake.  He is also advised to have his blood pressure monitored more closely at home.    Regarding his history of hypercholesterolemia, he is on moderate dose of Lipitor.  Need to recheck his lipid profile along with LFT.    Regarding his skin cancer, he has undergone multiple surgeries to remove those lesion.  He is being followed by the dermatologist    Regarding metabolic syndrome, he has been taking Wegovy for about 4 months.  He claims that he lost about 20 pounds but from February it looks like only 10 pounds.  I talked to him about diet especially low-carb diet    Regarding his chest tightness, it seems to be secondary to his hypertension.   Since it is better controlled he is feeling better also.    Regarding advanced directive, talked with him about living will and power of .  I gave him booklet regarding that    Overall cardiac status appears stable.  I will see him back in 6 months or sooner if needed                  Electronically signed by Desiree Collazo MD May 13, 2024 10:39 EDT

## 2024-05-14 RX ORDER — ATORVASTATIN CALCIUM 20 MG/1
20 TABLET, FILM COATED ORAL
Qty: 90 TABLET | Refills: 0 | Status: SHIPPED | OUTPATIENT
Start: 2024-05-14

## 2024-05-14 NOTE — TELEPHONE ENCOUNTER
Rx Refill Note  Requested Prescriptions     Pending Prescriptions Disp Refills    atorvastatin (LIPITOR) 20 MG tablet [Pharmacy Med Name: ATORVASTATIN 20 MG TABLET] 90 tablet      Sig: TAKE 1 TABLET BY MOUTH EVERY NIGHT AT BEDTIME      Last office visit with prescribing clinician: 2/26/2024   Last telemedicine visit with prescribing clinician: Visit date not found   Next office visit with prescribing clinician: 6/3/2024                         Would you like a call back once the refill request has been completed: [] Yes [] No    If the office needs to give you a call back, can they leave a voicemail: [] Yes [] No    Mariaelena Mcleod CMA  05/14/24, 17:02 EDT

## 2024-05-21 ENCOUNTER — TELEPHONE (OUTPATIENT)
Dept: FAMILY MEDICINE CLINIC | Facility: CLINIC | Age: 69
End: 2024-05-21

## 2024-05-21 NOTE — TELEPHONE ENCOUNTER
"Caller: Barrie Rose \"OFELIA\"    Relationship to patient: Self    Best call back number: [phone]     Patient is needing: PATIENT STATES HE RECEIVED A LETTER THAT TOLD HIM HIS PRESCRIPTION WOULD NOT BE ABLE TO BE FILLED DUE TO SHORTAGES. HE STATES THE LETTER ENCOURAGED HIM TO FIND AN IN-TOWN RETAIL PHARMACY BUT HE DOES NOT WANT TO DO THIS DUE TO IT BEING 10 X MORE EXPENSIVE.     PRESCRIPTION: WEGOVY 1.7 MG     PATIENT IS REQUESTING NEXT STEPS ON WHAT TO DO NEXT.    [facility] - [place] [place] - [address] [facility] AT Portal to Registered [facility] Sites - [phone]  - [phone]     PLEASE CALL PATIENT BACK, IF NO ANSWER LEAVE A DETAILED MESSAGE.  "

## 2024-05-22 RX ORDER — ALLOPURINOL 100 MG/1
100 TABLET ORAL
Qty: 30 TABLET | Refills: 5 | Status: SHIPPED | OUTPATIENT
Start: 2024-05-22

## 2024-06-03 ENCOUNTER — OFFICE VISIT (OUTPATIENT)
Dept: FAMILY MEDICINE CLINIC | Facility: CLINIC | Age: 69
End: 2024-06-03
Payer: COMMERCIAL

## 2024-06-03 VITALS
RESPIRATION RATE: 20 BRPM | HEIGHT: 73 IN | WEIGHT: 241.2 LBS | OXYGEN SATURATION: 97 % | DIASTOLIC BLOOD PRESSURE: 82 MMHG | HEART RATE: 71 BPM | TEMPERATURE: 97.7 F | SYSTOLIC BLOOD PRESSURE: 130 MMHG | BODY MASS INDEX: 31.97 KG/M2

## 2024-06-03 DIAGNOSIS — Z76.0 ENCOUNTER FOR MEDICATION REFILL: ICD-10-CM

## 2024-06-03 DIAGNOSIS — E66.09 CLASS 1 OBESITY DUE TO EXCESS CALORIES WITH SERIOUS COMORBIDITY AND BODY MASS INDEX (BMI) OF 31.0 TO 31.9 IN ADULT: Primary | ICD-10-CM

## 2024-06-03 DIAGNOSIS — I10 PRIMARY HYPERTENSION: ICD-10-CM

## 2024-06-03 PROCEDURE — 99213 OFFICE O/P EST LOW 20 MIN: CPT | Performed by: PSYCHOLOGIST

## 2024-06-03 RX ORDER — SEMAGLUTIDE 1 MG/.5ML
1 INJECTION, SOLUTION SUBCUTANEOUS WEEKLY
Qty: 2 ML | Refills: 2 | Status: SHIPPED | OUTPATIENT
Start: 2024-06-03 | End: 2024-06-07 | Stop reason: SDUPTHER

## 2024-06-03 NOTE — ASSESSMENT & PLAN NOTE
Hypertension is stable and controlled  Medication changes per orders.  Dietary sodium restriction.  Weight loss.  Regular aerobic exercise.  Blood pressure will be reassessed in 3 months.

## 2024-06-03 NOTE — PROGRESS NOTES
"Chief Complaint  Follow-up (Efficacy of med dose change (Wegovy)- obesity; states pharmacy can't get, missed x1 dose/HTN)    Subjective        Barrie Rose presents to [facility]   C/o follow up obesity 2. HTN  -- question on med re started by cardio - has been off amlodipine   For 6 mos and is doing good   Obesity  This is a chronic problem. The current episode started more than 1 year ago. The problem occurs constantly. The problem has been gradually improving. Pertinent negatives include no chest pain, fatigue, fever, headaches or joint swelling. He has tried walking for the symptoms. The treatment provided moderate relief.   Hypertension  This is a chronic problem. The current episode started more than 1 year ago. The problem has been stable since onset. Pertinent negatives include no chest pain, headaches, palpitations or shortness of breath. Risk factors for coronary artery disease include obesity and male gender. Past treatments include lifestyle changes. Current antihypertension treatment includes lifestyle changes.       Objective   Vital Signs:  /82 (BP Location: Left arm, Patient Position: Sitting, Cuff Size: Adult)   Pulse 71   Temp 97.7 °F (36.5 °C) (Temporal)   Resp 20   Ht 185.4 cm (73\")   Wt 109 kg (241 lb 3.2 oz)   SpO2 97%   BMI 31.82 kg/m²   Estimated body mass index is 31.82 kg/m² as calculated from the following:    Height as of this encounter: 185.4 cm (73\").    Weight as of this encounter: 109 kg (241 lb 3.2 oz).        Physical Exam  Vitals and nursing note reviewed.   Constitutional:       Appearance: Normal appearance. He is obese.   HENT:      Head: Normocephalic.      Right Ear: Tympanic membrane normal.      Left Ear: Tympanic membrane normal.      Nose: Nose normal.      Mouth/Throat:      Mouth: Mucous membranes are moist.   Eyes:      Extraocular Movements: Extraocular movements intact.      Pupils: Pupils are equal, round, and reactive " to light.   Cardiovascular:      Rate and Rhythm: Normal rate and regular rhythm.      Pulses: Normal pulses.      Heart sounds: Normal heart sounds.   Pulmonary:      Effort: Pulmonary effort is normal.      Breath sounds: Normal breath sounds.   Abdominal:      General: Abdomen is protuberant. Bowel sounds are normal.      Palpations: Abdomen is soft.   Musculoskeletal:         General: Normal range of motion.      Cervical back: Normal range of motion and neck supple.   Skin:     General: Skin is warm.      Capillary Refill: Capillary refill takes less than 2 seconds.   Neurological:      General: No focal deficit present.      Mental Status: He is alert and oriented to person, place, and time.   Psychiatric:         Mood and Affect: Mood normal.        Result Review :  The following data was reviewed by: Oseas Lam MD on 06/03/2024:  Common labs          8/24/2023    08:34 2/26/2024    09:09   Common Labs   Glucose 96  87    BUN 19  19    Creatinine 1.00  0.85    Sodium 139  141    Potassium 4.1  4.1    Chloride 103  101    Calcium 9.1  9.4    Albumin 4.3  4.5    Total Bilirubin 0.5  0.5    Alkaline Phosphatase 79  80    AST (SGOT) 23  27    ALT (SGPT) 30  39    WBC 8.66  8.66    Hemoglobin 15.2  15.9    Hematocrit 45.7  48.3    Platelets 190  198    Total Cholesterol 133  147    Triglycerides 129  138    HDL Cholesterol 60  68    LDL Cholesterol  51  56    Hemoglobin A1C 5.70  5.10    PSA 0.419     Uric Acid 5.1       Data reviewed : Radiologic studies 10/20/23 [facility]            Assessment and Plan   Diagnoses and all orders for this visit:    1. Class 1 obesity due to excess calories with serious comorbidity and body mass index (BMI) of 31.0 to 31.9 in adult (Primary)  Comments:  signed conent for GLP1 - D/W PX AND VERBALIZES UNDERSTANDING  DENIES FH MEDULLARY THYROID CA NO PMH PANCREATITIS    2. Primary hypertension  Assessment & Plan:  Hypertension is stable and controlled  Medication changes per  orders.  Dietary sodium restriction.  Weight loss.  Regular aerobic exercise.  Blood pressure will be reassessed in 3 months.      3. Encounter for medication refill    Other orders  -     Semaglutide-Weight Management (Wegovy) 1 MG/0.5ML solution auto-injector; Inject 0.5 mL under the skin into the appropriate area as directed 1 (One) Time Per Week for 12 doses.  Dispense: 2 mL; Refill: 2           I spent 20 minutes caring for Barrie on this date of service. This time includes time spent by me in the following activities:reviewing tests, obtaining and/or reviewing a separately obtained history, performing a medically appropriate examination and/or evaluation , counseling and educating the patient/family/caregiver, ordering medications, tests, or procedures, and documenting information in the medical record       Follow Up   Return for already has a follow up Annual Physical .  Patient was given instructions and counseling regarding his condition or for health maintenance advice. Please see specific information pulled into the AVS if appropriate.           This document has been electronically signed by Oseas Lam MD  Darlene 3, 2024 09:27 EDT   guidewire recovered/lumen(s) aspirated and flushed/sterile dressing applied/sterile technique, catheter placed/ultrasound guidance with use of sterile gel and probe cove

## 2024-06-06 ENCOUNTER — TELEPHONE (OUTPATIENT)
Dept: FAMILY MEDICINE CLINIC | Facility: CLINIC | Age: 69
End: 2024-06-06

## 2024-06-06 NOTE — TELEPHONE ENCOUNTER
"Caller: Barrie Rose \"OFELIA\"    Relationship: Self    Best call back number: [phone]    Which medication are you concerned about: WEGOVY 1.7 MG    Who prescribed you this medication:   DR. KRAUSE    What are your concerns: PATIENT STATES THAT [facility]  IS UNABLE TO FILL DUE TO SHORTAGE AND IS REQUESTING THAT A NEW PRESCRIPTION IS SENT TO [facility] - [place], [place] - [address] [facility] [address] - [phone] [facility] [phone] FX       "

## 2024-06-07 RX ORDER — SEMAGLUTIDE 1.7 MG/.75ML
1.7 INJECTION, SOLUTION SUBCUTANEOUS WEEKLY
Qty: 9 ML | Refills: 0 | Status: SHIPPED | OUTPATIENT
Start: 2024-06-07 | End: 2024-08-24

## 2024-06-11 NOTE — TELEPHONE ENCOUNTER
PT CALLED STATED THAT PHARMACY DOES NOT HAVE RX WEGOVY IN STOCK AND WILL LIKE TO GO WITH THE COMPOUND PHARMACY THAT PCP SUGGESTED.    PLEASE ADVISE.  CALL BACK:[phone]

## 2024-06-24 RX ORDER — SEMAGLUTIDE 1.7 MG/.75ML
1.7 INJECTION, SOLUTION SUBCUTANEOUS WEEKLY
Qty: 9 ML | Refills: 0 | Status: SHIPPED | OUTPATIENT
Start: 2024-06-24 | End: 2024-09-10

## 2024-06-24 NOTE — TELEPHONE ENCOUNTER
Rx Refill Note  Requested Prescriptions     Pending Prescriptions Disp Refills    Semaglutide-Weight Management (Wegovy) 1.7 MG/0.75ML solution auto-injector 9 mL 0     Sig: Inject 0.75 mL under the skin into the appropriate area as directed 1 (One) Time Per Week for 12 doses.      Last office visit with prescribing clinician: 6/3/2024   Last telemedicine visit with prescribing clinician: Visit date not found   Next office visit with prescribing clinician: 8/27/2024                         Would you like a call back once the refill request has been completed: [] Yes [] No    If the office needs to give you a call back, can they leave a voicemail: [] Yes [] No    Mariaelena Mcleod CMA  06/24/24, 08:16 EDT

## 2024-06-25 ENCOUNTER — TELEPHONE (OUTPATIENT)
Dept: FAMILY MEDICINE CLINIC | Facility: CLINIC | Age: 69
End: 2024-06-25

## 2024-06-25 NOTE — TELEPHONE ENCOUNTER
Pt called and stated that Dr. Lam said something about getting a compounded version of his weygovy if he couldn't find the shot in stock at a pharmacy his insurance approves. He said he can't find the shot anywhere wants to know if she would try calling in the compounded medication if not trying him on a different medication.

## 2024-07-23 ENCOUNTER — TELEPHONE (OUTPATIENT)
Dept: FAMILY MEDICINE CLINIC | Facility: CLINIC | Age: 69
End: 2024-07-23

## 2024-07-23 NOTE — TELEPHONE ENCOUNTER
"Caller: Barrie Rose \"OFELIA\"    Relationship: Self    Best call back number: [phone]     Requested Prescriptions:   CELECOXIB 200 MG - TAKE 1 CAPSULE DAILY        [facility] be sent[facility] - [address] [facility] AT PORTAL TO REGISTERED [facility] - [phone]  - [phone] FX     Last office visit with prescribing clinician: 6/3/2024   Last telemedicine visit with prescribing clinician: Visit date not found   Next office visit with prescribing clinician: 8/27/2024     Additional details provided by patient: I DID NOT SEE THIS ON PATIENTS LIST. PATIENT IS COMPLETELY OUT AND HIS PHARMACY NEEDS US TO CALL TO PROVIDE MORE INFORMATION, PLEASE ADVISE     Does the patient have less than a 3 day supply:  [x] Yes  [] No    Would you like a call back once the refill request has been completed: [x] Yes [] No    If the office needs to give you a call back, can they leave a voicemail: [x] Yes [] No    Ruben Chapman Rep   07/23/24 16:13 EDT       "

## 2024-07-25 RX ORDER — CELECOXIB 200 MG/1
200 CAPSULE ORAL
Qty: 90 CAPSULE | Refills: 0 | Status: SHIPPED | OUTPATIENT
Start: 2024-07-25 | End: 2024-10-23

## 2024-08-05 RX ORDER — LOSARTAN POTASSIUM AND HYDROCHLOROTHIAZIDE 12.5; 1 MG/1; MG/1
1 TABLET ORAL
Qty: 90 TABLET | Refills: 0 | Status: SHIPPED | OUTPATIENT
Start: 2024-08-05

## 2024-08-05 NOTE — TELEPHONE ENCOUNTER
Rx Refill Note  Requested Prescriptions     Pending Prescriptions Disp Refills    losartan-hydrochlorothiazide (HYZAAR) 100-12.5 MG per tablet [Pharmacy Med Name: LOSARTAN-HCTZ 100-12.5 MG TAB] 90 tablet 0     Sig: TAKE 1 TABLET BY MOUTH DAILY WITH BREAKFAST      Last office visit with prescribing clinician: 6/3/2024   Last telemedicine visit with prescribing clinician: Visit date not found   Next office visit with prescribing clinician: 8/27/2024                         Would you like a call back once the refill request has been completed: [] Yes [] No    If the office needs to give you a call back, can they leave a voicemail: [] Yes [] No    Mariaelena Mcleod CMA  08/05/24, 09:53 EDT

## 2024-09-10 ENCOUNTER — OFFICE VISIT (OUTPATIENT)
Dept: FAMILY MEDICINE CLINIC | Facility: CLINIC | Age: 69
End: 2024-09-10
Payer: COMMERCIAL

## 2024-09-10 VITALS
HEIGHT: 73 IN | TEMPERATURE: 97.5 F | DIASTOLIC BLOOD PRESSURE: 72 MMHG | SYSTOLIC BLOOD PRESSURE: 118 MMHG | OXYGEN SATURATION: 96 % | WEIGHT: 239 LBS | BODY MASS INDEX: 31.68 KG/M2 | RESPIRATION RATE: 20 BRPM | HEART RATE: 68 BPM

## 2024-09-10 DIAGNOSIS — Z00.00 ENCOUNTER FOR ANNUAL PHYSICAL EXAM: Primary | ICD-10-CM

## 2024-09-10 DIAGNOSIS — Z12.5 SCREENING FOR PROSTATE CANCER: ICD-10-CM

## 2024-09-10 DIAGNOSIS — I10 PRIMARY HYPERTENSION: ICD-10-CM

## 2024-09-10 DIAGNOSIS — E66.09 CLASS 1 OBESITY DUE TO EXCESS CALORIES WITH SERIOUS COMORBIDITY AND BODY MASS INDEX (BMI) OF 31.0 TO 31.9 IN ADULT: ICD-10-CM

## 2024-09-10 DIAGNOSIS — Z76.0 ENCOUNTER FOR ISSUE OF REPEAT PRESCRIPTION: ICD-10-CM

## 2024-09-10 LAB
ALBUMIN SERPL-MCNC: 4.3 G/DL (ref 3.5–5.2)
ALBUMIN/GLOB SERPL: 1.5 G/DL
ALP SERPL-CCNC: 73 U/L (ref 39–117)
ALT SERPL W P-5'-P-CCNC: 47 U/L (ref 1–41)
AMYLASE SERPL-CCNC: 43 U/L (ref 28–100)
ANION GAP SERPL CALCULATED.3IONS-SCNC: 11.8 MMOL/L (ref 5–15)
AST SERPL-CCNC: 84 U/L (ref 1–40)
BASOPHILS # BLD AUTO: 0.04 10*3/MM3 (ref 0–0.2)
BASOPHILS NFR BLD AUTO: 0.5 % (ref 0–1.5)
BILIRUB BLD-MCNC: NEGATIVE MG/DL
BILIRUB SERPL-MCNC: 0.8 MG/DL (ref 0–1.2)
BUN SERPL-MCNC: 24 MG/DL (ref 8–23)
BUN/CREAT SERPL: 27.6 (ref 7–25)
CALCIUM SPEC-SCNC: 9.1 MG/DL (ref 8.6–10.5)
CHLORIDE SERPL-SCNC: 103 MMOL/L (ref 98–107)
CHOLEST SERPL-MCNC: 141 MG/DL (ref 0–200)
CLARITY, POC: CLEAR
CO2 SERPL-SCNC: 27.2 MMOL/L (ref 22–29)
COLOR UR: YELLOW
CREAT SERPL-MCNC: 0.87 MG/DL (ref 0.76–1.27)
DEPRECATED RDW RBC AUTO: 44.5 FL (ref 37–54)
EGFRCR SERPLBLD CKD-EPI 2021: 92.8 ML/MIN/1.73
EOSINOPHIL # BLD AUTO: 0.16 10*3/MM3 (ref 0–0.4)
EOSINOPHIL NFR BLD AUTO: 2 % (ref 0.3–6.2)
ERYTHROCYTE [DISTWIDTH] IN BLOOD BY AUTOMATED COUNT: 13 % (ref 12.3–15.4)
GLOBULIN UR ELPH-MCNC: 2.9 GM/DL
GLUCOSE SERPL-MCNC: 95 MG/DL (ref 65–99)
GLUCOSE UR STRIP-MCNC: NEGATIVE MG/DL
HBA1C MFR BLD: 5.6 % (ref 4.8–5.6)
HCT VFR BLD AUTO: 43.5 % (ref 37.5–51)
HDLC SERPL-MCNC: 64 MG/DL (ref 40–60)
HGB BLD-MCNC: 14.6 G/DL (ref 13–17.7)
IMM GRANULOCYTES # BLD AUTO: 0.02 10*3/MM3 (ref 0–0.05)
IMM GRANULOCYTES NFR BLD AUTO: 0.3 % (ref 0–0.5)
KETONES UR QL: NEGATIVE
LDLC SERPL CALC-MCNC: 57 MG/DL (ref 0–100)
LDLC/HDLC SERPL: 0.84 {RATIO}
LEUKOCYTE EST, POC: NEGATIVE
LIPASE SERPL-CCNC: 25 U/L (ref 13–60)
LYMPHOCYTES # BLD AUTO: 2.36 10*3/MM3 (ref 0.7–3.1)
LYMPHOCYTES NFR BLD AUTO: 29.9 % (ref 19.6–45.3)
MCH RBC QN AUTO: 31.5 PG (ref 26.6–33)
MCHC RBC AUTO-ENTMCNC: 33.6 G/DL (ref 31.5–35.7)
MCV RBC AUTO: 93.8 FL (ref 79–97)
MONOCYTES # BLD AUTO: 0.66 10*3/MM3 (ref 0.1–0.9)
MONOCYTES NFR BLD AUTO: 8.4 % (ref 5–12)
NEUTROPHILS NFR BLD AUTO: 4.64 10*3/MM3 (ref 1.7–7)
NEUTROPHILS NFR BLD AUTO: 58.9 % (ref 42.7–76)
NITRITE UR-MCNC: NEGATIVE MG/ML
NRBC BLD AUTO-RTO: 0 /100 WBC (ref 0–0.2)
PH UR: 6 [PH] (ref 5–8)
PLATELET # BLD AUTO: 174 10*3/MM3 (ref 140–450)
PMV BLD AUTO: 11 FL (ref 6–12)
POTASSIUM SERPL-SCNC: 3.8 MMOL/L (ref 3.5–5.2)
PROT SERPL-MCNC: 7.2 G/DL (ref 6–8.5)
PROT UR STRIP-MCNC: NEGATIVE MG/DL
RBC # BLD AUTO: 4.64 10*6/MM3 (ref 4.14–5.8)
RBC # UR STRIP: ABNORMAL /UL
SODIUM SERPL-SCNC: 142 MMOL/L (ref 136–145)
SP GR UR: 1.03 (ref 1–1.03)
TRIGL SERPL-MCNC: 115 MG/DL (ref 0–150)
TSH SERPL DL<=0.05 MIU/L-ACNC: 1.08 UIU/ML (ref 0.27–4.2)
UROBILINOGEN UR QL: NORMAL
VLDLC SERPL-MCNC: 20 MG/DL (ref 5–40)
WBC NRBC COR # BLD AUTO: 7.88 10*3/MM3 (ref 3.4–10.8)

## 2024-09-10 PROCEDURE — 80061 LIPID PANEL: CPT | Performed by: PSYCHOLOGIST

## 2024-09-10 PROCEDURE — G0103 PSA SCREENING: HCPCS | Performed by: PSYCHOLOGIST

## 2024-09-10 PROCEDURE — 83690 ASSAY OF LIPASE: CPT | Performed by: PSYCHOLOGIST

## 2024-09-10 PROCEDURE — 82150 ASSAY OF AMYLASE: CPT | Performed by: PSYCHOLOGIST

## 2024-09-10 PROCEDURE — 80050 GENERAL HEALTH PANEL: CPT | Performed by: PSYCHOLOGIST

## 2024-09-10 PROCEDURE — 83036 HEMOGLOBIN GLYCOSYLATED A1C: CPT | Performed by: PSYCHOLOGIST

## 2024-09-10 PROCEDURE — 99397 PER PM REEVAL EST PAT 65+ YR: CPT | Performed by: PSYCHOLOGIST

## 2024-09-10 PROCEDURE — 81002 URINALYSIS NONAUTO W/O SCOPE: CPT | Performed by: PSYCHOLOGIST

## 2024-09-10 PROCEDURE — 82306 VITAMIN D 25 HYDROXY: CPT | Performed by: PSYCHOLOGIST

## 2024-09-10 PROCEDURE — 82607 VITAMIN B-12: CPT | Performed by: PSYCHOLOGIST

## 2024-09-10 RX ORDER — ALLOPURINOL 100 MG/1
100 TABLET ORAL
Qty: 90 TABLET | Refills: 3 | Status: SHIPPED | OUTPATIENT
Start: 2024-09-10 | End: 2025-09-05

## 2024-09-10 RX ORDER — LOSARTAN POTASSIUM AND HYDROCHLOROTHIAZIDE 12.5; 1 MG/1; MG/1
1 TABLET ORAL
Qty: 90 TABLET | Refills: 3 | Status: SHIPPED | OUTPATIENT
Start: 2024-09-10 | End: 2025-09-05

## 2024-09-10 RX ORDER — AMLODIPINE BESYLATE 5 MG/1
5 TABLET ORAL
Qty: 90 TABLET | Refills: 3 | Status: SHIPPED | OUTPATIENT
Start: 2024-09-10 | End: 2025-09-05

## 2024-09-10 RX ORDER — ALBUTEROL SULFATE 90 UG/1
2 AEROSOL, METERED RESPIRATORY (INHALATION) EVERY 6 HOURS PRN
Qty: 18 G | Refills: 6 | Status: SHIPPED | OUTPATIENT
Start: 2024-09-10 | End: 2024-09-17

## 2024-09-10 RX ORDER — CELECOXIB 200 MG/1
200 CAPSULE ORAL
Qty: 90 CAPSULE | Refills: 3 | Status: SHIPPED | OUTPATIENT
Start: 2024-09-10 | End: 2025-09-05

## 2024-09-10 RX ORDER — ATORVASTATIN CALCIUM 20 MG/1
20 TABLET, FILM COATED ORAL
Qty: 90 TABLET | Refills: 3 | Status: SHIPPED | OUTPATIENT
Start: 2024-09-10 | End: 2025-09-05

## 2024-09-10 RX ORDER — SEMAGLUTIDE 1.7 MG/.75ML
1.7 INJECTION, SOLUTION SUBCUTANEOUS WEEKLY
Qty: 9 ML | Refills: 3 | Status: SHIPPED | OUTPATIENT
Start: 2024-09-10 | End: 2025-08-06

## 2024-09-10 RX ORDER — LORATADINE 10 MG/1
10 TABLET ORAL AS NEEDED
Qty: 90 TABLET | Refills: 3 | Status: SHIPPED | OUTPATIENT
Start: 2024-09-10 | End: 2024-12-09

## 2024-09-10 RX ORDER — METOPROLOL TARTRATE 50 MG
50 TABLET ORAL
Qty: 90 TABLET | Refills: 3 | Status: SHIPPED | OUTPATIENT
Start: 2024-09-10 | End: 2025-09-05

## 2024-09-10 NOTE — PROGRESS NOTES
"Chief Complaint  Weight Loss (FOLLOW UP ) and Annual Exam    Subjective        Barrie Rose presents to [facility] PRIMARY [facility]  C/O FOLLOW UP OBESITY   Weight Loss  This is a chronic problem. The current episode started more than 1 year ago. The problem occurs constantly. The problem has been gradually improving. Pertinent negatives include no chest pain, fatigue, fever, headaches, joint swelling, nausea or neck pain. He has tried walking for the symptoms. The treatment provided moderate relief.   Hypertension  This is a chronic problem. The current episode started more than 1 year ago. The problem has been stable since onset. The problem is controlled. Pertinent negatives include no blurred vision, chest pain, headaches, neck pain, palpitations or shortness of breath. Risk factors for coronary artery disease include obesity, male gender and smoking/tobacco exposure. Past treatments include lifestyle changes, diuretics, ACE inhibitors and calcium channel blockers. Current antihypertension treatment includes lifestyle changes, diuretics, ACE inhibitors and calcium channel blockers. The current treatment provides moderate improvement. There are no compliance problems.  There is no history of angina, kidney disease or CAD/MI. There is no history of chronic renal disease.       Objective   Vital Signs:  /72 (BP Location: Left arm, Patient Position: Sitting, Cuff Size: Adult)   Pulse 68   Temp 97.5 °F (36.4 °C) (Temporal)   Resp 20   Ht 185.4 cm (73\")   Wt 108 kg (239 lb)   SpO2 96%   BMI 31.53 kg/m²   Estimated body mass index is 31.53 kg/m² as calculated from the following:    Height as of this encounter: 185.4 cm (73\").    Weight as of this encounter: 108 kg (239 lb).            Physical Exam  Vitals and nursing note reviewed. Exam conducted with a chaperone present.   Constitutional:       General: He is awake.      Appearance: Normal appearance. He is well-developed and " well-groomed. He is obese.   HENT:      Head: Normocephalic and atraumatic.      Jaw: There is normal jaw occlusion.      Right Ear: Hearing, tympanic membrane, ear canal and external ear normal.      Left Ear: Hearing, tympanic membrane, ear canal and external ear normal.      Nose: Nose normal.      Mouth/Throat:      Lips: Pink.      Mouth: Mucous membranes are moist.      Pharynx: Oropharynx is clear.   Eyes:      General: Lids are normal. Vision grossly intact. Gaze aligned appropriately.      Extraocular Movements: Extraocular movements intact.      Conjunctiva/sclera: Conjunctivae normal.      Pupils: Pupils are equal, round, and reactive to light.   Neck:      Trachea: Trachea and phonation normal.   Cardiovascular:      Rate and Rhythm: Normal rate and regular rhythm.      Pulses: Normal pulses.      Heart sounds: Normal heart sounds.   Pulmonary:      Effort: Pulmonary effort is normal.      Breath sounds: Normal breath sounds and air entry.   Abdominal:      General: Abdomen is protuberant. Bowel sounds are normal.      Palpations: Abdomen is soft.   Genitourinary:     Rectum: Normal.   Musculoskeletal:         General: Normal range of motion.      Right shoulder: Normal.      Left shoulder: Normal.      Right upper arm: Normal.      Left upper arm: Normal.      Right elbow: Normal.      Left elbow: Normal.      Right forearm: Normal.      Left forearm: Normal.      Right wrist: Normal.      Left wrist: Normal.      Right hand: Normal.      Left hand: Normal.      Cervical back: Normal, full passive range of motion without pain, normal range of motion and neck supple.      Thoracic back: Normal.      Lumbar back: Normal.      Right hip: Normal.      Left hip: Normal.      Right upper leg: Normal.      Left upper leg: Normal.      Right knee: Normal.      Left knee: Normal.      Right lower leg: Normal. No edema.      Left lower leg: Normal. No edema.      Right ankle: Normal.      Right Achilles Tendon:  Normal.      Left ankle: Normal.      Left Achilles Tendon: Normal.      Right foot: Normal.      Left foot: Normal.   Lymphadenopathy:      Cervical: No cervical adenopathy.   Skin:     General: Skin is warm.      Capillary Refill: Capillary refill takes less than 2 seconds.   Neurological:      General: No focal deficit present.      Mental Status: He is alert and oriented to person, place, and time.      Cranial Nerves: Cranial nerves 2-12 are intact.      Sensory: Sensation is intact.      Motor: Motor function is intact.      Coordination: Coordination is intact.      Gait: Gait is intact.      Deep Tendon Reflexes: Reflexes are normal and symmetric.   Psychiatric:         Attention and Perception: Attention and perception normal.         Mood and Affect: Mood and affect normal.         Speech: Speech normal.         Behavior: Behavior normal. Behavior is cooperative.         Thought Content: Thought content normal.         Cognition and Memory: Cognition and memory normal.         Judgment: Judgment normal.        Result Review :  The following data was reviewed by: Oseas Lam MD on 09/10/2024:  Common labs          2/26/2024    09:09   Common Labs   Glucose 87    BUN 19    Creatinine 0.85    Sodium 141    Potassium 4.1    Chloride 101    Calcium 9.4    Albumin 4.5    Total Bilirubin 0.5    Alkaline Phosphatase 80    AST (SGOT) 27    ALT (SGPT) 39    WBC 8.66    Hemoglobin 15.9    Hematocrit 48.3    Platelets 198    Total Cholesterol 147    Triglycerides 138    HDL Cholesterol 68    LDL Cholesterol  56    Hemoglobin A1C 5.10      Data reviewed : Radiologic studies 10/20/23 [facility] [place]           Assessment and Plan   Diagnoses and all orders for this visit:    1. Encounter for annual physical exam (Primary)  -     CBC & Differential  -     Comprehensive Metabolic Panel  -     Hemoglobin A1c  -     Lipid Panel  -     TSH  -     Vitamin B12  -     Vitamin D,25-Hydroxy  -     POC Urinalysis Dipstick  -      PSA Screen; Future  -     Amylase; Future  -     Lipase; Future    2. Class 1 obesity due to excess calories with serious comorbidity and body mass index (BMI) of 31.0 to 31.9 in adult  Assessment & Plan:  Patient's (Body mass index is 31.53 kg/m².) indicates that they are obese (BMI >30) with health conditions that include obstructive sleep apnea, hypertension, coronary heart disease, diabetes mellitus, impaired fasting glucose, dyslipidemias, GERD, and osteoarthritis . Weight is improving with treatment. BMI  is above average; BMI management plan is completed. We discussed portion control, increasing exercise, and joining a fitness center or start home based exercise program.     Orders:  -     CBC & Differential  -     Comprehensive Metabolic Panel  -     Hemoglobin A1c  -     Lipid Panel  -     TSH  -     Vitamin B12  -     Vitamin D,25-Hydroxy  -     POC Urinalysis Dipstick  -     Amylase; Future  -     Lipase; Future    3. Primary hypertension  Assessment & Plan:  Hypertension is stable and controlled  Continue current treatment regimen.  Medication changes per orders.  Dietary sodium restriction.  Weight loss.  Regular aerobic exercise.  Blood pressure will be reassessed in 6 months.    Orders:  -     amLODIPine (NORVASC) 5 MG tablet; Take 1 tablet by mouth Daily With Lunch for 360 days.  Dispense: 90 tablet; Refill: 3  -     CBC & Differential  -     Comprehensive Metabolic Panel  -     Hemoglobin A1c  -     Lipid Panel  -     TSH  -     Vitamin B12  -     Vitamin D,25-Hydroxy  -     POC Urinalysis Dipstick    4. Screening for prostate cancer  -     PSA Screen; Future    5. Encounter for issue of repeat prescription    Other orders  -     losartan-hydrochlorothiazide (HYZAAR) 100-12.5 MG per tablet; Take 1 tablet by mouth Daily With Breakfast for 360 days.  Dispense: 90 tablet; Refill: 3  -     metoprolol tartrate (LOPRESSOR) 50 MG tablet; Take 1 tablet by mouth Daily With Dinner for 360 days.  Dispense: 90  tablet; Refill: 3  -     Semaglutide-Weight Management (Wegovy) 1.7 MG/0.75ML solution auto-injector; Inject 0.75 mL under the skin into the appropriate area as directed 1 (One) Time Per Week for 48 doses.  Dispense: 9 mL; Refill: 3  -     loratadine (CLARITIN) 10 MG tablet; Take 1 tablet by mouth As Needed for Allergies for up to 90 days.  Dispense: 90 tablet; Refill: 3  -     allopurinol (ZYLOPRIM) 100 MG tablet; Take 1 tablet by mouth Daily With Breakfast for 360 days.  Dispense: 90 tablet; Refill: 3  -     albuterol sulfate  (90 Base) MCG/ACT inhaler; Inhale 2 puffs Every 6 (Six) Hours As Needed for Wheezing for up to 7 days.  Dispense: 18 g; Refill: 6  -     atorvastatin (LIPITOR) 20 MG tablet; Take 1 tablet by mouth every night at bedtime for 360 days.  Dispense: 90 tablet; Refill: 3  -     celecoxib (CeleBREX) 200 MG capsule; Take 1 capsule by mouth Daily With Lunch for 360 days.  Dispense: 90 capsule; Refill: 3           I spent 30 minutes caring for Barrie on this date of service. This time includes time spent by me in the following activities:reviewing tests, obtaining and/or reviewing a separately obtained history, performing a medically appropriate examination and/or evaluation , counseling and educating the patient/family/caregiver, ordering medications, tests, or procedures, and documenting information in the medical record       Follow Up   No follow-ups on file.  Patient was given instructions and counseling regarding his condition or for health maintenance advice. Please see specific information pulled into the AVS if appropriate.           This document has been electronically signed by Oseas Lam MD  September 10, 2024 15:32 EDT

## 2024-09-10 NOTE — ASSESSMENT & PLAN NOTE
Hypertension is stable and controlled  Continue current treatment regimen.  Medication changes per orders.  Dietary sodium restriction.  Weight loss.  Regular aerobic exercise.  Blood pressure will be reassessed in 6 months.

## 2024-09-10 NOTE — ASSESSMENT & PLAN NOTE
Patient's (Body mass index is 31.53 kg/m².) indicates that they are obese (BMI >30) with health conditions that include obstructive sleep apnea, hypertension, coronary heart disease, diabetes mellitus, impaired fasting glucose, dyslipidemias, GERD, and osteoarthritis . Weight is improving with treatment. BMI  is above average; BMI management plan is completed. We discussed portion control, increasing exercise, and joining a fitness center or start home based exercise program.

## 2024-09-11 LAB
25(OH)D3 SERPL-MCNC: 40.7 NG/ML (ref 30–100)
PSA SERPL-MCNC: 0.42 NG/ML (ref 0–4)
VIT B12 BLD-MCNC: 555 PG/ML (ref 211–946)

## 2024-10-10 ENCOUNTER — OFFICE VISIT (OUTPATIENT)
Dept: FAMILY MEDICINE CLINIC | Facility: CLINIC | Age: 69
End: 2024-10-10
Payer: COMMERCIAL

## 2024-10-10 VITALS
OXYGEN SATURATION: 95 % | TEMPERATURE: 96 F | DIASTOLIC BLOOD PRESSURE: 72 MMHG | RESPIRATION RATE: 18 BRPM | WEIGHT: 239.2 LBS | HEIGHT: 73 IN | HEART RATE: 90 BPM | SYSTOLIC BLOOD PRESSURE: 122 MMHG | BODY MASS INDEX: 31.7 KG/M2

## 2024-10-10 DIAGNOSIS — Z76.0 ENCOUNTER FOR MEDICATION REFILL: ICD-10-CM

## 2024-10-10 DIAGNOSIS — E66.09 CLASS 1 OBESITY DUE TO EXCESS CALORIES WITH SERIOUS COMORBIDITY AND BODY MASS INDEX (BMI) OF 31.0 TO 31.9 IN ADULT: ICD-10-CM

## 2024-10-10 DIAGNOSIS — I10 PRIMARY HYPERTENSION: Primary | ICD-10-CM

## 2024-10-10 DIAGNOSIS — E78.00 HYPERCHOLESTEROLEMIA: ICD-10-CM

## 2024-10-10 DIAGNOSIS — E66.811 CLASS 1 OBESITY DUE TO EXCESS CALORIES WITH SERIOUS COMORBIDITY AND BODY MASS INDEX (BMI) OF 31.0 TO 31.9 IN ADULT: ICD-10-CM

## 2024-10-10 PROCEDURE — 99213 OFFICE O/P EST LOW 20 MIN: CPT | Performed by: PSYCHOLOGIST

## 2024-10-10 RX ORDER — SEMAGLUTIDE 1.7 MG/.75ML
1.7 INJECTION, SOLUTION SUBCUTANEOUS WEEKLY
Qty: 9 ML | Refills: 3 | Status: SHIPPED | OUTPATIENT
Start: 2024-10-10 | End: 2025-09-05

## 2024-10-10 NOTE — ASSESSMENT & PLAN NOTE
Patient's (Body mass index is 31.56 kg/m².) indicates that they are obese (BMI >30) with health conditions that include obstructive sleep apnea, hypertension, coronary heart disease, diabetes mellitus, impaired fasting glucose, dyslipidemias, GERD, and osteoarthritis . Weight is improving with treatment. BMI  is above average; BMI management plan is completed. We discussed portion control, increasing exercise, and joining a fitness center or start home based exercise program.

## 2024-10-10 NOTE — PROGRESS NOTES
"Chief Complaint  Follow-up, Hypertension, and Med Refill    Subjective        Barrie Rose presents to [facility]  C/O chronic illness 2. Med refill    Follow-up  Conditions present:  Hypertension and Hyperlipidemia  Associated symptoms include: shortness of breath. Pertinent negatives include no chest pain, no nausea, no fatigue, no blurred vision, no headaches, no peripheral edema, no myalgias and no abdominal pain. Treatment compliance: all of the time. Treatment barriers: no compliance problems. Exercises: daily.   Hypertension: Hypertension status: controlled.     End-organ damage: no angina, no kidney disease and no CAD/MI  Current antihypertension treatment: ACE inhibitors, lifestyle changes and calcium channel blockers. Past treatments: ACE inhibitors, calcium channel blockers and lifestyle changes.   Hyperlipidemia: Recent lipid tests: normal. Exacerbating diseases: obesity. Current antihyperlipidemic treatment: statins, diet change and exercise.   Hypertension  Associated symptoms include shortness of breath. Pertinent negatives include no blurred vision, chest pain, headaches or peripheral edema. There is no history of angina, kidney disease or CAD/MI.   Obesity  This is a chronic problem. The current episode started more than 1 year ago. The problem occurs constantly. The problem has been gradually improving. Pertinent negatives include no abdominal pain, chest pain, fatigue, fever, headaches, myalgias, nausea or vomiting. He has tried walking for the symptoms. The treatment provided mild relief.       Objective   Vital Signs:  /72 (BP Location: Right arm, Patient Position: Sitting, Cuff Size: Adult)   Pulse 90   Temp 96 °F (35.6 °C) (Temporal)   Resp 18   Ht 185.4 cm (73\")   Wt 109 kg (239 lb 3.2 oz)   SpO2 95%   BMI 31.56 kg/m²   Estimated body mass index is 31.56 kg/m² as calculated from the following:    Height as of this encounter: 185.4 cm (73\").    " Weight as of this encounter: 109 kg (239 lb 3.2 oz).        Physical Exam  Vitals and nursing note reviewed.   Constitutional:       Appearance: Normal appearance. He is obese.   HENT:      Head: Normocephalic.      Right Ear: Tympanic membrane normal.      Left Ear: Tympanic membrane normal.      Nose: Nose normal.      Mouth/Throat:      Mouth: Mucous membranes are moist.   Eyes:      Extraocular Movements: Extraocular movements intact.      Pupils: Pupils are equal, round, and reactive to light.   Cardiovascular:      Rate and Rhythm: Normal rate and regular rhythm.      Pulses: Normal pulses.      Heart sounds: Normal heart sounds.   Pulmonary:      Effort: Pulmonary effort is normal.      Breath sounds: Normal breath sounds.   Abdominal:      General: Abdomen is protuberant. Bowel sounds are normal.      Palpations: Abdomen is soft.   Musculoskeletal:         General: Normal range of motion.      Cervical back: Normal range of motion and neck supple.   Skin:     General: Skin is warm.      Capillary Refill: Capillary refill takes less than 2 seconds.   Neurological:      General: No focal deficit present.      Mental Status: He is alert and oriented to person, place, and time.   Psychiatric:         Mood and Affect: Mood normal.        Result Review :  The following data was reviewed by: Oseas Lam MD on 10/10/2024:  Common labs          2/26/2024    09:09 9/10/2024    15:41   Common Labs   Glucose 87  95    BUN 19  24    Creatinine 0.85  0.87    Sodium 141  142    Potassium 4.1  3.8    Chloride 101  103    Calcium 9.4  9.1    Albumin 4.5  4.3    Total Bilirubin 0.5  0.8    Alkaline Phosphatase 80  73    AST (SGOT) 27  84    ALT (SGPT) 39  47    WBC 8.66  7.88    Hemoglobin 15.9  14.6    Hematocrit 48.3  43.5    Platelets 198  174    Total Cholesterol 147  141    Triglycerides 138  115    HDL Cholesterol 68  64    LDL Cholesterol  56  57    Hemoglobin A1C 5.10  5.60    PSA  0.418      Data reviewed :  Radiologic studies 10/20/23  Gallbladder           Assessment and Plan   Diagnoses and all orders for this visit:    1. Primary hypertension (Primary)  Assessment & Plan:  Hypertension is stable and controlled  Continue current treatment regimen.  Dietary sodium restriction.  Weight loss.  Regular aerobic exercise.  Blood pressure will be reassessed in 6 months.      2. Hypercholesterolemia  Assessment & Plan:   Lipid abnormalities are improving with treatment    Plan:  Continue same medication/s without change.      Discussed medication dosage, use, side effects, and goals of treatment in detail.    Counseled patient on lifestyle modifications to help control hyperlipidemia.     Patient Treatment Goals:   LDL goal is less than 70    Followup in 6 months.      3. Class 1 obesity due to excess calories with serious comorbidity and body mass index (BMI) of 31.0 to 31.9 in adult  Assessment & Plan:  Patient's (Body mass index is 31.56 kg/m².) indicates that they are obese (BMI >30) with health conditions that include obstructive sleep apnea, hypertension, coronary heart disease, diabetes mellitus, impaired fasting glucose, dyslipidemias, GERD, and osteoarthritis . Weight is improving with treatment. BMI  is above average; BMI management plan is completed. We discussed portion control, increasing exercise, and joining a fitness center or start home based exercise program.       4. Encounter for medication refill    Other orders  -     Semaglutide-Weight Management (Wegovy) 1.7 MG/0.75ML solution auto-injector; Inject 0.75 mL under the skin into the appropriate area as directed 1 (One) Time Per Week for 48 doses.  Dispense: 9 mL; Refill: 3           I spent 30 minutes caring for Barrie on this date of service. This time includes time spent by me in the following activities:reviewing tests, obtaining and/or reviewing a separately obtained history, performing a medically appropriate examination and/or evaluation ,  counseling and educating the patient/family/caregiver, ordering medications, tests, or procedures, and documenting information in the medical record       Follow Up   Return if symptoms worsen or fail to improve.  Patient was given instructions and counseling regarding his condition or for health maintenance advice. Please see specific information pulled into the AVS if appropriate.           This document has been electronically signed by Oseas Lam MD  October 10, 2024 09:27 EDT

## 2024-10-17 ENCOUNTER — TELEPHONE (OUTPATIENT)
Dept: FAMILY MEDICINE CLINIC | Facility: CLINIC | Age: 69
End: 2024-10-17
Payer: COMMERCIAL

## 2024-10-17 RX ORDER — SEMAGLUTIDE 1.7 MG/.75ML
1.7 INJECTION, SOLUTION SUBCUTANEOUS WEEKLY
Qty: 9 ML | Refills: 3 | Status: SHIPPED | OUTPATIENT
Start: 2024-10-17 | End: 2025-09-12

## 2025-03-26 ENCOUNTER — OFFICE VISIT (OUTPATIENT)
Dept: FAMILY MEDICINE CLINIC | Facility: CLINIC | Age: 70
End: 2025-03-26
Payer: COMMERCIAL

## 2025-03-26 VITALS
HEART RATE: 74 BPM | RESPIRATION RATE: 18 BRPM | BODY MASS INDEX: 33.29 KG/M2 | HEIGHT: 73 IN | TEMPERATURE: 97.7 F | WEIGHT: 251.2 LBS | OXYGEN SATURATION: 97 % | DIASTOLIC BLOOD PRESSURE: 78 MMHG | SYSTOLIC BLOOD PRESSURE: 138 MMHG

## 2025-03-26 DIAGNOSIS — M10.09 ACUTE IDIOPATHIC GOUT OF MULTIPLE SITES: ICD-10-CM

## 2025-03-26 DIAGNOSIS — E66.09 CLASS 1 OBESITY DUE TO EXCESS CALORIES WITH SERIOUS COMORBIDITY AND BODY MASS INDEX (BMI) OF 33.0 TO 33.9 IN ADULT: ICD-10-CM

## 2025-03-26 DIAGNOSIS — I10 PRIMARY HYPERTENSION: Primary | ICD-10-CM

## 2025-03-26 DIAGNOSIS — E88.810 METABOLIC SYNDROME: ICD-10-CM

## 2025-03-26 DIAGNOSIS — M79.10 MUSCLE PAIN: ICD-10-CM

## 2025-03-26 DIAGNOSIS — E66.811 CLASS 1 OBESITY DUE TO EXCESS CALORIES WITH SERIOUS COMORBIDITY AND BODY MASS INDEX (BMI) OF 33.0 TO 33.9 IN ADULT: ICD-10-CM

## 2025-03-26 RX ORDER — CYCLOBENZAPRINE HCL 5 MG
5 TABLET ORAL 2 TIMES DAILY PRN
Qty: 42 TABLET | Refills: 0 | Status: SHIPPED | OUTPATIENT
Start: 2025-03-26

## 2025-03-26 RX ORDER — TIRZEPATIDE 2.5 MG/.5ML
2.5 INJECTION, SOLUTION SUBCUTANEOUS WEEKLY
Qty: 2 ML | Refills: 0 | Status: SHIPPED | OUTPATIENT
Start: 2025-03-26 | End: 2025-04-23

## 2025-03-26 RX ORDER — HYDROCHLOROTHIAZIDE 25 MG/1
25 TABLET ORAL DAILY
Qty: 90 TABLET | Refills: 0 | Status: SHIPPED | OUTPATIENT
Start: 2025-03-26 | End: 2025-06-24

## 2025-03-26 RX ORDER — LOSARTAN POTASSIUM 100 MG/1
100 TABLET ORAL DAILY
Qty: 90 TABLET | Refills: 0 | Status: SHIPPED | OUTPATIENT
Start: 2025-03-26 | End: 2025-06-24

## 2025-03-30 NOTE — PROGRESS NOTES
[facility]     Chief Complaint  Establish Care (New patient - Patient is looking to establish care with a new PCP. Patient states he is doing well. )    Barrie Rose is a 69 y.o. male who presents today to [facility] for Establish Care (New patient - Patient is looking to establish care with a new PCP. Patient states he is doing well. ).    HPI:   HPI   History of Present Illness  The patient presents for evaluation of hypertension, shoulder pain, gout, and weight management.    He has been managing his hypertension with two medications but discontinued one due to a significant decrease in blood pressure. His current regimen includes atorvastatin and Hyzaar (losartan/hydrochlorothiazide). He reports persistent edema, which is noticeable upon removing his socks daily. Initially, the combination therapy was effective in reducing the swelling, but its efficacy has diminished over time. He recalls a period when he was on a separate medication that effectively managed his condition without any complications. His blood pressure readings at home have been slightly elevated this week. He attributes this increase to recent stressors. He is considering either an increase in the dosage of the combination medication or a return to the separate medication. He does not require any refills at this time. He is currently taking losartan 100 mg daily.    He reports a pulled muscle in his shoulder, which has been causing discomfort for the past 2 days. He is considering the use of a muscle relaxer for relief. He has previously used muscle relaxers, which were beneficial and did not induce drowsiness. The pain is localized to the inside of the shoulder blade and extends downwards. He suspects the pain may be due to excessive golfing and has abstained from the activity for the past 3 days.    He has a history of gout with 3 to 4 flare-ups annually. He prefers to manage the flare-ups as  they occur rather than maintaining a daily medication regimen.    He was previously on Wegovy but discontinued it due to unavailability. He is interested in resuming the medication or exploring alternative options. He reports no history of myocardial infarction or pancreatitis. He experienced severe constipation while on Wegovy, a symptom he had not previously encountered and which resolved upon discontinuation of the medication. He maintains a low food intake but acknowledges that his diet may not be optimal and admits to a lack of regular exercise. His physical activity is limited to walking and occasional golfing. He continues to work full-time, primarily seated at a computer.    SOCIAL HISTORY  He works full time on the computer.      MEDICATIONS  Current: Atorvastatin, Hyzaar (losartan/hydrochlorothiazide)  Past: Wegovy    Previous History:   Past Medical History:   Diagnosis Date    Cancer     hx of skin cancer    Gout     Hypertension       Past Surgical History:   Procedure Laterality Date    APPENDECTOMY      CARDIOVASCULAR STRESS TEST  10/02/2023    4.50 Min. 7.0 METS. 88% THR. 196/73. EF 63%. Inferior Infarct Vs Diaphragmatic attenuation    COLONOSCOPY      CYST REMOVAL Left 01/18/2023    BACK OF LEFT LEG    ECHO - CONVERTED  10/02/2023    TLS. EF 65%. Trace-Mild MR. AO- 3.9. RVSP- 12 mmHg    VASECTOMY        Social History     Socioeconomic History    Marital status:    Tobacco Use    Smoking status: Every Day     Current packs/day: 0.25     Average packs/day: 0.3 packs/day for 55.2 years (13.8 ttl pk-yrs)     Types: Cigars, Cigarettes     Start date: 1/1/1970     Passive exposure: Current    Smokeless tobacco: Never    Tobacco comments:     Two cigara per day.  Do not inhale   Vaping Use    Vaping status: Never Used   Substance and Sexual Activity    Alcohol use: Yes     Alcohol/week: 10.0 standard drinks of alcohol     Types: 10 Shots of liquor per week     Comment: Evening to relax    Drug use:  "Never    Sexual activity: Yes     Partners: Female     Birth control/protection: None      There are no preventive care reminders to display for this patient.     Current Medications:  Current Outpatient Medications   Medication Sig Dispense Refill    atorvastatin (LIPITOR) 20 MG tablet Take 1 tablet by mouth every night at bedtime for 360 days. 90 tablet 3    cyclobenzaprine (FLEXERIL) 5 MG tablet Take 1 tablet by mouth 2 (Two) Times a Day As Needed for Muscle Spasms. 42 tablet 0    sildenafil (Viagra) 100 MG tablet Take 1 tablet by mouth As Needed.      hydroCHLOROthiazide 25 MG tablet Take 1 tablet by mouth Daily for 90 days. 90 tablet 0    losartan (Cozaar) 100 MG tablet Take 1 tablet by mouth Daily for 90 days. 90 tablet 0    Tirzepatide-Weight Management (Zepbound) 2.5 MG/0.5ML solution Inject 0.5 mL under the skin into the appropriate area as directed 1 (One) Time Per Week for 28 days. 2 mL 0     No current facility-administered medications for this visit.       Allergies:   Allergies   Allergen Reactions    Bee Venom Swelling       Vitals:   /78 (BP Location: Right arm, Patient Position: Sitting, Cuff Size: Adult)   Pulse 74   Temp 97.7 °F (36.5 °C) (Temporal)   Resp 18   Ht 185.4 cm (73\")   Wt 114 kg (251 lb 3.2 oz)   SpO2 97%   BMI 33.14 kg/m²   Estimated body mass index is 33.14 kg/m² as calculated from the following:    Height as of this encounter: 185.4 cm (73\").    Weight as of this encounter: 114 kg (251 lb 3.2 oz).    Barrie Rose  reports that he has been smoking cigars and cigarettes. He started smoking about 55 years ago. He has a 13.8 pack-year smoking history. He has been exposed to tobacco smoke. He has never used smokeless tobacco. I have educated him on the risk of diseases from using tobacco products such as cancer, COPD, and heart disease.     I advised him to quit and he is not willing to quit.    I spent 3  minutes counseling the patient.           Physical Exam: "   Physical Exam   Physical Exam  Oral exam was performed.  Lungs were auscultated.       Lab Results:   No visits with results within 3 Month(s) from this visit.   Latest known visit with results is:   Office Visit on 09/10/2024   Component Date Value Ref Range Status    Glucose 09/10/2024 95  65 - 99 mg/dL Final    BUN 09/10/2024 24 (H)  8 - 23 mg/dL Final    Creatinine 09/10/2024 0.87  0.76 - 1.27 mg/dL Final    Sodium 09/10/2024 142  136 - 145 mmol/L Final    Potassium 09/10/2024 3.8  3.5 - 5.2 mmol/L Final    Chloride 09/10/2024 103  98 - 107 mmol/L Final    CO2 09/10/2024 27.2  22.0 - 29.0 mmol/L Final    Calcium 09/10/2024 9.1  8.6 - 10.5 mg/dL Final    Total Protein 09/10/2024 7.2  6.0 - 8.5 g/dL Final    Albumin 09/10/2024 4.3  3.5 - 5.2 g/dL Final    ALT (SGPT) 09/10/2024 47 (H)  1 - 41 U/L Final    AST (SGOT) 09/10/2024 84 (H)  1 - 40 U/L Final    Alkaline Phosphatase 09/10/2024 73  39 - 117 U/L Final    Total Bilirubin 09/10/2024 0.8  0.0 - 1.2 mg/dL Final    Globulin 09/10/2024 2.9  gm/dL Final    A/G Ratio 09/10/2024 1.5  g/dL Final    BUN/Creatinine Ratio 09/10/2024 27.6 (H)  7.0 - 25.0 Final    Anion Gap 09/10/2024 11.8  5.0 - 15.0 mmol/L Final    eGFR 09/10/2024 92.8  >60.0 mL/min/1.73 Final    Hemoglobin A1C 09/10/2024 5.60  4.80 - 5.60 % Final    Total Cholesterol 09/10/2024 141  0 - 200 mg/dL Final    Triglycerides 09/10/2024 115  0 - 150 mg/dL Final    HDL Cholesterol 09/10/2024 64 (H)  40 - 60 mg/dL Final    LDL Cholesterol  09/10/2024 57  0 - 100 mg/dL Final    VLDL Cholesterol 09/10/2024 20  5 - 40 mg/dL Final    LDL/HDL Ratio 09/10/2024 0.84   Final    TSH 09/10/2024 1.080  0.270 - 4.200 uIU/mL Final    Vitamin B-12 09/10/2024 555  211 - 946 pg/mL Final    25 Hydroxy, Vitamin D 09/10/2024 40.7  30.0 - 100.0 ng/ml Final    Color 09/10/2024 Yellow  Yellow, Straw, Dark Yellow, Capri Final    Clarity, UA 09/10/2024 Clear  Clear Final    Glucose, UA 09/10/2024 Negative  Negative mg/dL Final     Bilirubin 09/10/2024 Negative  Negative Final    Ketones, UA 09/10/2024 Negative  Negative Final    Specific Gravity  09/10/2024 1.030  1.005 - 1.030 Final    Blood, UA 09/10/2024 3+ (A)  Negative Final    pH, Urine 09/10/2024 6.0  5.0 - 8.0 Final    Protein, POC 09/10/2024 Negative  Negative mg/dL Final    Urobilinogen, UA 09/10/2024 Normal  Normal, 0.2 E.U./dL Final    Leukocytes 09/10/2024 Negative  Negative Final    Nitrite, UA 09/10/2024 Negative  Negative Final    PSA 09/10/2024 0.418  0.000 - 4.000 ng/mL Final    Lipase 09/10/2024 25  13 - 60 U/L Final    Amylase 09/10/2024 43  28 - 100 U/L Final    WBC 09/10/2024 7.88  3.40 - 10.80 10*3/mm3 Final    RBC 09/10/2024 4.64  4.14 - 5.80 10*6/mm3 Final    Hemoglobin 09/10/2024 14.6  13.0 - 17.7 g/dL Final    Hematocrit 09/10/2024 43.5  37.5 - 51.0 % Final    MCV 09/10/2024 93.8  79.0 - 97.0 fL Final    MCH 09/10/2024 31.5  26.6 - 33.0 pg Final    MCHC 09/10/2024 33.6  31.5 - 35.7 g/dL Final    RDW 09/10/2024 13.0  12.3 - 15.4 % Final    RDW-SD 09/10/2024 44.5  37.0 - 54.0 fl Final    MPV 09/10/2024 11.0  6.0 - 12.0 fL Final    Platelets 09/10/2024 174  140 - 450 10*3/mm3 Final    Neutrophil % 09/10/2024 58.9  42.7 - 76.0 % Final    Lymphocyte % 09/10/2024 29.9  19.6 - 45.3 % Final    Monocyte % 09/10/2024 8.4  5.0 - 12.0 % Final    Eosinophil % 09/10/2024 2.0  0.3 - 6.2 % Final    Basophil % 09/10/2024 0.5  0.0 - 1.5 % Final    Immature Grans % 09/10/2024 0.3  0.0 - 0.5 % Final    Neutrophils, Absolute 09/10/2024 4.64  1.70 - 7.00 10*3/mm3 Final    Lymphocytes, Absolute 09/10/2024 2.36  0.70 - 3.10 10*3/mm3 Final    Monocytes, Absolute 09/10/2024 0.66  0.10 - 0.90 10*3/mm3 Final    Eosinophils, Absolute 09/10/2024 0.16  0.00 - 0.40 10*3/mm3 Final    Basophils, Absolute 09/10/2024 0.04  0.00 - 0.20 10*3/mm3 Final    Immature Grans, Absolute 09/10/2024 0.02  0.00 - 0.05 10*3/mm3 Final    nRBC 09/10/2024 0.0  0.0 - 0.2 /100 WBC Final     Results      Results review:  During today's encounter, all relevant clinical data has been reviewed.      Assessment and Plan  Diagnoses and all orders for this visit:    1. Primary hypertension (Primary)    2. Muscle pain    3. Acute idiopathic gout of multiple sites    4. Metabolic syndrome    5. Class 1 obesity due to excess calories with serious comorbidity and body mass index (BMI) of 33.0 to 33.9 in adult    Other orders  -     cyclobenzaprine (FLEXERIL) 5 MG tablet; Take 1 tablet by mouth 2 (Two) Times a Day As Needed for Muscle Spasms.  Dispense: 42 tablet; Refill: 0  -     Tirzepatide-Weight Management (Zepbound) 2.5 MG/0.5ML solution; Inject 0.5 mL under the skin into the appropriate area as directed 1 (One) Time Per Week for 28 days.  Dispense: 2 mL; Refill: 0  -     losartan (Cozaar) 100 MG tablet; Take 1 tablet by mouth Daily for 90 days.  Dispense: 90 tablet; Refill: 0  -     hydroCHLOROthiazide 25 MG tablet; Take 1 tablet by mouth Daily for 90 days.  Dispense: 90 tablet; Refill: 0      Assessment & Plan  1. Hypertension.  His blood pressure has been slightly elevated recently, potentially due to stress. The current regimen includes losartan 100 mg daily and hydrochlorothiazide 12.5 mg. The plan is to separate these medications and increase hydrochlorothiazide to 25 mg daily to help with swelling. He is advised to monitor his blood pressure closely. If it becomes too low, he should halve the hydrochlorothiazide dose. Will do labs at upcoming visit.     2. Shoulder pain.  He reports muscle pain in the shoulder, likely due to playing golf. A prescription for Flexeril will be provided to manage the pain as this has helped in the past.    3. Gout.  He experiences 3-4 flare-ups per year. The plan is to treat the flare-ups as they occur rather than maintaining a daily medication regimen. If the condition worsens, he will inform us so that we can consider reintroducing a preventive medication.    4-5. Weight management.  He expressed  interest in resuming Wegovy but reported severe constipation with its use. An alternative medication, Zepbound, will be prescribed for weight loss. A month's supply of Zepbound will be provided.    We discussed the risks of GLP-1 agonist medications. Patient denies denies personal or family history of medullary thyroid carcinoma, multiple endocrine neoplasia, pancreatitis, gallbladder disease or diabetic retinopathy.  In rare cases, the following side effects have occurred when using GLP-1 agonist medication:  Serious allergic reactions, including rash, itching/swelling (especially of the face/tongue/throat), swelling of the feet, ankles, or lower legs, severe dizziness, trouble breathing, pancreatitis, heart palpitations or dehydration with or without kidney injuries.  There is also some increased risk of gallstone attacks.  Common side effects when using GLP-1 agonist medication include nausea, headache, diarrhea, vomiting, constipation, abdominal pain, headache, fatigue, dyspepsia, dizziness, abdominal distention, belching, hypoglycemia, flatulence, gastroenteritis, and reflux or injection site reaction such as itching, burning at the site of administration.      Follow-up  The patient will follow up in 4 weeks for lab work.            New Medications:   New Medications Ordered This Visit   Medications    cyclobenzaprine (FLEXERIL) 5 MG tablet     Sig: Take 1 tablet by mouth 2 (Two) Times a Day As Needed for Muscle Spasms.     Dispense:  42 tablet     Refill:  0    Tirzepatide-Weight Management (Zepbound) 2.5 MG/0.5ML solution     Sig: Inject 0.5 mL under the skin into the appropriate area as directed 1 (One) Time Per Week for 28 days.     Dispense:  2 mL     Refill:  0    losartan (Cozaar) 100 MG tablet     Sig: Take 1 tablet by mouth Daily for 90 days.     Dispense:  90 tablet     Refill:  0    hydroCHLOROthiazide 25 MG tablet     Sig: Take 1 tablet by mouth Daily for 90 days.     Dispense:  90 tablet      Refill:  0       Discontinued Medications:   Medications Discontinued During This Encounter   Medication Reason    celecoxib (CeleBREX) 200 MG capsule     cyclobenzaprine (FLEXERIL) 5 MG tablet     loratadine (CLARITIN) 10 MG tablet     allopurinol (ZYLOPRIM) 100 MG tablet     amLODIPine (NORVASC) 5 MG tablet     Aspirin-Caffeine 845-65 MG pack     metoprolol tartrate (LOPRESSOR) 50 MG tablet     losartan-hydrochlorothiazide (HYZAAR) 100-12.5 MG per tablet     Semaglutide-Weight Management (Wegovy) 1.7 MG/0.75ML solution auto-injector               Visit Diagnoses:    ICD-10-CM ICD-9-CM   1. Primary hypertension  I10 401.9   2. Muscle pain  M79.10 729.1   3. Acute idiopathic gout of multiple sites  M10.09 274.01   4. Metabolic syndrome  E88.810 277.7   5. Class 1 obesity due to excess calories with serious comorbidity and body mass index (BMI) of 33.0 to 33.9 in adult  E66.811 278.00    E66.09 V85.33    Z68.33             Follow Up:   Return in about 4 weeks (around 4/23/2025).    Patient was given instructions and counseling regarding his condition or for health maintenance advice. Please see specific information pulled into the AVS if appropriate.     Patient or patient representative verbalized consent for the use of Ambient Listening during the visit with  EDGAR Bolivar for chart documentation. 3/30/2025  15:37 EDT      This document has been electronically signed by EDGAR Bolivar   March 30, 2025 15:47 EDT    Dictated Utilizing Dragon Dictation: Part of this note may be an electronic transcription/translation of spoken language to printed text using the Dragon Dictation System.

## 2025-04-23 ENCOUNTER — LAB (OUTPATIENT)
Dept: FAMILY MEDICINE CLINIC | Facility: CLINIC | Age: 70
End: 2025-04-23
Payer: COMMERCIAL

## 2025-04-23 ENCOUNTER — OFFICE VISIT (OUTPATIENT)
Dept: FAMILY MEDICINE CLINIC | Facility: CLINIC | Age: 70
End: 2025-04-23
Payer: COMMERCIAL

## 2025-04-23 VITALS
OXYGEN SATURATION: 95 % | BODY MASS INDEX: 33.37 KG/M2 | TEMPERATURE: 97.5 F | DIASTOLIC BLOOD PRESSURE: 88 MMHG | SYSTOLIC BLOOD PRESSURE: 138 MMHG | WEIGHT: 251.8 LBS | HEART RATE: 74 BPM | HEIGHT: 73 IN

## 2025-04-23 DIAGNOSIS — E78.2 MIXED HYPERLIPIDEMIA: ICD-10-CM

## 2025-04-23 DIAGNOSIS — R73.9 HYPERGLYCEMIA: ICD-10-CM

## 2025-04-23 DIAGNOSIS — E78.2 MIXED HYPERLIPIDEMIA: Primary | ICD-10-CM

## 2025-04-23 DIAGNOSIS — E55.9 VITAMIN D DEFICIENCY: ICD-10-CM

## 2025-04-23 DIAGNOSIS — R53.83 OTHER FATIGUE: ICD-10-CM

## 2025-04-23 DIAGNOSIS — M54.40 CHRONIC LOW BACK PAIN WITH SCIATICA, SCIATICA LATERALITY UNSPECIFIED, UNSPECIFIED BACK PAIN LATERALITY: ICD-10-CM

## 2025-04-23 DIAGNOSIS — I10 PRIMARY HYPERTENSION: ICD-10-CM

## 2025-04-23 DIAGNOSIS — G89.29 CHRONIC LOW BACK PAIN WITH SCIATICA, SCIATICA LATERALITY UNSPECIFIED, UNSPECIFIED BACK PAIN LATERALITY: ICD-10-CM

## 2025-04-23 LAB
ALBUMIN SERPL-MCNC: 4.2 G/DL (ref 3.5–5.2)
ALBUMIN/GLOB SERPL: 1.4 G/DL
ALP SERPL-CCNC: 89 U/L (ref 39–117)
ALT SERPL W P-5'-P-CCNC: 37 U/L (ref 1–41)
ANION GAP SERPL CALCULATED.3IONS-SCNC: 11 MMOL/L (ref 5–15)
AST SERPL-CCNC: 32 U/L (ref 1–40)
BACTERIA UR QL AUTO: ABNORMAL /HPF
BILIRUB SERPL-MCNC: 0.5 MG/DL (ref 0–1.2)
BILIRUB UR QL STRIP: NEGATIVE
BUN SERPL-MCNC: 20 MG/DL (ref 8–23)
BUN/CREAT SERPL: 19.4 (ref 7–25)
CALCIUM SPEC-SCNC: 9.4 MG/DL (ref 8.6–10.5)
CHLORIDE SERPL-SCNC: 102 MMOL/L (ref 98–107)
CHOLEST SERPL-MCNC: 138 MG/DL (ref 0–200)
CLARITY UR: CLEAR
CO2 SERPL-SCNC: 26 MMOL/L (ref 22–29)
COLOR UR: YELLOW
CREAT SERPL-MCNC: 1.03 MG/DL (ref 0.76–1.27)
DEPRECATED RDW RBC AUTO: 42.3 FL (ref 37–54)
EGFRCR SERPLBLD CKD-EPI 2021: 78.1 ML/MIN/1.73
ERYTHROCYTE [DISTWIDTH] IN BLOOD BY AUTOMATED COUNT: 12.5 % (ref 12.3–15.4)
GLOBULIN UR ELPH-MCNC: 2.9 GM/DL
GLUCOSE SERPL-MCNC: 101 MG/DL (ref 65–99)
GLUCOSE UR STRIP-MCNC: NEGATIVE MG/DL
HBA1C MFR BLD: 5.5 % (ref 4.8–5.6)
HCT VFR BLD AUTO: 46 % (ref 37.5–51)
HDLC SERPL-MCNC: 54 MG/DL (ref 40–60)
HGB BLD-MCNC: 16.1 G/DL (ref 13–17.7)
HGB UR QL STRIP.AUTO: ABNORMAL
HOLD SPECIMEN: NORMAL
HYALINE CASTS UR QL AUTO: ABNORMAL /LPF
KETONES UR QL STRIP: ABNORMAL
LDLC SERPL CALC-MCNC: 61 MG/DL (ref 0–100)
LDLC/HDLC SERPL: 1.07 {RATIO}
LEUKOCYTE ESTERASE UR QL STRIP.AUTO: NEGATIVE
MCH RBC QN AUTO: 32.3 PG (ref 26.6–33)
MCHC RBC AUTO-ENTMCNC: 35 G/DL (ref 31.5–35.7)
MCV RBC AUTO: 92.4 FL (ref 79–97)
NITRITE UR QL STRIP: NEGATIVE
PH UR STRIP.AUTO: 5.5 [PH] (ref 5–8)
PLATELET # BLD AUTO: 194 10*3/MM3 (ref 140–450)
PMV BLD AUTO: 11.6 FL (ref 6–12)
POTASSIUM SERPL-SCNC: 4 MMOL/L (ref 3.5–5.2)
PROT SERPL-MCNC: 7.1 G/DL (ref 6–8.5)
PROT UR QL STRIP: ABNORMAL
RBC # BLD AUTO: 4.98 10*6/MM3 (ref 4.14–5.8)
RBC # UR STRIP: ABNORMAL /HPF
REF LAB TEST METHOD: ABNORMAL
SODIUM SERPL-SCNC: 139 MMOL/L (ref 136–145)
SP GR UR STRIP: 1.02 (ref 1–1.03)
SQUAMOUS #/AREA URNS HPF: ABNORMAL /HPF
TRIGL SERPL-MCNC: 130 MG/DL (ref 0–150)
TSH SERPL DL<=0.05 MIU/L-ACNC: 2.58 UIU/ML (ref 0.27–4.2)
UROBILINOGEN UR QL STRIP: ABNORMAL
VLDLC SERPL-MCNC: 23 MG/DL (ref 5–40)
WBC # UR STRIP: ABNORMAL /HPF
WBC NRBC COR # BLD AUTO: 9.69 10*3/MM3 (ref 3.4–10.8)

## 2025-04-23 PROCEDURE — 80061 LIPID PANEL: CPT | Performed by: NURSE PRACTITIONER

## 2025-04-23 PROCEDURE — 82607 VITAMIN B-12: CPT | Performed by: NURSE PRACTITIONER

## 2025-04-23 PROCEDURE — 83036 HEMOGLOBIN GLYCOSYLATED A1C: CPT | Performed by: NURSE PRACTITIONER

## 2025-04-23 PROCEDURE — 81001 URINALYSIS AUTO W/SCOPE: CPT | Performed by: NURSE PRACTITIONER

## 2025-04-23 PROCEDURE — 36415 COLL VENOUS BLD VENIPUNCTURE: CPT

## 2025-04-23 PROCEDURE — 82306 VITAMIN D 25 HYDROXY: CPT | Performed by: NURSE PRACTITIONER

## 2025-04-23 PROCEDURE — 80050 GENERAL HEALTH PANEL: CPT | Performed by: NURSE PRACTITIONER

## 2025-04-23 RX ORDER — METHYLPREDNISOLONE 4 MG/1
TABLET ORAL
Qty: 21 TABLET | Refills: 0 | Status: SHIPPED | OUTPATIENT
Start: 2025-04-23

## 2025-04-23 RX ORDER — DEXAMETHASONE SODIUM PHOSPHATE 4 MG/ML
8 INJECTION, SOLUTION INTRA-ARTICULAR; INTRALESIONAL; INTRAMUSCULAR; INTRAVENOUS; SOFT TISSUE ONCE
Status: DISCONTINUED | OUTPATIENT
Start: 2025-04-23 | End: 2025-04-23

## 2025-04-23 RX ORDER — DEXAMETHASONE SODIUM PHOSPHATE 4 MG/ML
8 INJECTION, SOLUTION INTRA-ARTICULAR; INTRALESIONAL; INTRAMUSCULAR; INTRAVENOUS; SOFT TISSUE ONCE
Status: COMPLETED | OUTPATIENT
Start: 2025-04-23 | End: 2025-04-23

## 2025-04-23 RX ORDER — KETOROLAC TROMETHAMINE 30 MG/ML
30 INJECTION, SOLUTION INTRAMUSCULAR; INTRAVENOUS ONCE
Status: COMPLETED | OUTPATIENT
Start: 2025-04-23 | End: 2025-04-23

## 2025-04-23 RX ADMIN — KETOROLAC TROMETHAMINE 30 MG: 30 INJECTION, SOLUTION INTRAMUSCULAR; INTRAVENOUS at 09:30

## 2025-04-23 RX ADMIN — DEXAMETHASONE SODIUM PHOSPHATE 8 MG: 4 INJECTION, SOLUTION INTRA-ARTICULAR; INTRALESIONAL; INTRAMUSCULAR; INTRAVENOUS; SOFT TISSUE at 09:32

## 2025-04-24 LAB
25(OH)D3 SERPL-MCNC: 40.2 NG/ML (ref 30–100)
VIT B12 BLD-MCNC: 613 PG/ML (ref 211–946)

## 2025-04-25 ENCOUNTER — TELEPHONE (OUTPATIENT)
Dept: FAMILY MEDICINE CLINIC | Facility: CLINIC | Age: 70
End: 2025-04-25

## 2025-04-25 NOTE — TELEPHONE ENCOUNTER
"  Caller: Barrie Rose \"OFELIA\"    Relationship to patient: Self      Best call back number: [phone]     Provider: SANCHEZ MAZA    Medication PA needed: WEGOVY     Reason for call/Prior Auth: INSURANCE NEEDS A PRIOR AUTH FOR THIS. PATIENT SAID HIS LABS SHOWED HE WAS VERY CLOSE TO BEING DIABETIC AND TO POINT THAT OUT IN THE PRIOR AUTH. CALL WHEN DONE SO PATIENT KNOWS.   \".   "

## 2025-04-25 NOTE — PROGRESS NOTES
[facility]     Chief Complaint  Back Pain    Barrie Rose is a 69 y.o. male who presents today to [facility] for Back Pain.    HPI:   Back Pain       History of Present Illness  The patient presents for evaluation of lumbago, hypertension, and obesity.    The patient reports a history of chronic lumbago, persisting for approximately 50 years, with radicular pain along the sciatic nerve. Currently, he is experiencing an exacerbation of this condition. He has previously sought chiropractic care and received injections for management of this issue. Radiographic imaging of the L4 and L5 vertebrae has been performed.    The patient has been unable to obtain Zepbound due to lack of communication from the pharmacy or insurance company. His insurance has recently approved coverage for Wegovy, which he is interested in trying again, he did experience some constipation previously.     The patient denies any history of diabetes mellitus or prediabetes. He also denies a history of nephrolithiasis but reports persistent microscopic hematuria. Additionally, he has a long-standing history of elevated hepatic enzymes, spanning over 30 years, without a diagnosis of hepatic steatosis.    Previous History:   Past Medical History:   Diagnosis Date    Cancer     hx of skin cancer    Gout     Hypertension       Past Surgical History:   Procedure Laterality Date    APPENDECTOMY      CARDIOVASCULAR STRESS TEST  10/02/2023    4.50 Min. 7.0 METS. 88% THR. 196/73. EF 63%. Inferior Infarct Vs Diaphragmatic attenuation    COLONOSCOPY      CYST REMOVAL Left 01/18/2023    BACK OF LEFT LEG    ECHO - CONVERTED  10/02/2023    TLS. EF 65%. Trace-Mild MR. AO- 3.9. RVSP- 12 mmHg    VASECTOMY        Social History     Socioeconomic History    Marital status:    Tobacco Use    Smoking status: Every Day     Current packs/day: 0.25     Average packs/day: 0.3 packs/day for 55.3 years (13.8 ttl pk-yrs)  "    Types: Cigars, Cigarettes     Start date: 1/1/1970     Passive exposure: Current    Smokeless tobacco: Never    Tobacco comments:     Two cigara per day.  Do not inhale   Vaping Use    Vaping status: Never Used   Substance and Sexual Activity    Alcohol use: Yes     Alcohol/week: 10.0 standard drinks of alcohol     Types: 10 Shots of liquor per week     Comment: Evening to relax    Drug use: Never    Sexual activity: Yes     Partners: Female     Birth control/protection: None      There are no preventive care reminders to display for this patient.     Current Medications:  Current Outpatient Medications   Medication Sig Dispense Refill    atorvastatin (LIPITOR) 20 MG tablet Take 1 tablet by mouth every night at bedtime for 360 days. 90 tablet 3    cyclobenzaprine (FLEXERIL) 5 MG tablet Take 1 tablet by mouth 2 (Two) Times a Day As Needed for Muscle Spasms. 42 tablet 0    hydroCHLOROthiazide 25 MG tablet Take 1 tablet by mouth Daily for 90 days. 90 tablet 0    losartan (Cozaar) 100 MG tablet Take 1 tablet by mouth Daily for 90 days. 90 tablet 0    sildenafil (Viagra) 100 MG tablet Take 1 tablet by mouth As Needed.      methylPREDNISolone (MEDROL) 4 MG dose pack Take as directed on package instructions. 21 tablet 0    Semaglutide-Weight Management 0.25 MG/0.5ML solution auto-injector Inject 0.5 mL under the skin into the appropriate area as directed 1 (One) Time Per Week. 2 mL 1     No current facility-administered medications for this visit.       Allergies:   Allergies   Allergen Reactions    Bee Venom Swelling       Vitals:   /88 (BP Location: Right arm, Patient Position: Sitting, Cuff Size: Adult)   Pulse 74   Temp 97.5 °F (36.4 °C) (Temporal)   Ht 185.4 cm (73\")   Wt 114 kg (251 lb 12.8 oz)   SpO2 95%   BMI 33.22 kg/m²   Estimated body mass index is 33.22 kg/m² as calculated from the following:    Height as of this encounter: 185.4 cm (73\").    Weight as of this encounter: 114 kg (251 lb 12.8 " oz).    Barrie Rose  reports that he has been smoking cigars and cigarettes. He started smoking about 55 years ago. He has a 13.8 pack-year smoking history. He has been exposed to tobacco smoke. He has never used smokeless tobacco. I have educated him on the risk of diseases from using tobacco products such as cancer, COPD, and heart disease.     I advised him to quit and he is not willing to quit.    I spent 3  minutes counseling the patient.           Physical Exam:   Physical Exam  Vitals reviewed.   Constitutional:       Appearance: Normal appearance.   HENT:      Head: Normocephalic.   Eyes:      Extraocular Movements: Extraocular movements intact.      Conjunctiva/sclera: Conjunctivae normal.   Cardiovascular:      Rate and Rhythm: Normal rate.      Heart sounds: Normal heart sounds.   Pulmonary:      Effort: Pulmonary effort is normal.      Breath sounds: Normal breath sounds.   Abdominal:      General: Bowel sounds are normal.      Palpations: Abdomen is soft.   Musculoskeletal:      Lumbar back: Tenderness present. Decreased range of motion.   Skin:     General: Skin is warm and dry.   Neurological:      Mental Status: He is alert. Mental status is at baseline.      Comments: Normal gait    Psychiatric:         Mood and Affect: Mood normal.        Physical Exam         Lab Results:   Lab on 04/23/2025   Component Date Value Ref Range Status    WBC 04/23/2025 9.69  3.40 - 10.80 10*3/mm3 Final    RBC 04/23/2025 4.98  4.14 - 5.80 10*6/mm3 Final    Hemoglobin 04/23/2025 16.1  13.0 - 17.7 g/dL Final    Hematocrit 04/23/2025 46.0  37.5 - 51.0 % Final    MCV 04/23/2025 92.4  79.0 - 97.0 fL Final    MCH 04/23/2025 32.3  26.6 - 33.0 pg Final    MCHC 04/23/2025 35.0  31.5 - 35.7 g/dL Final    RDW 04/23/2025 12.5  12.3 - 15.4 % Final    RDW-SD 04/23/2025 42.3  37.0 - 54.0 fl Final    MPV 04/23/2025 11.6  6.0 - 12.0 fL Final    Platelets 04/23/2025 194  140 - 450 10*3/mm3 Final    Glucose 04/23/2025 101 (H)  65 -  99 mg/dL Final    BUN 04/23/2025 20  8 - 23 mg/dL Final    Creatinine 04/23/2025 1.03  0.76 - 1.27 mg/dL Final    Sodium 04/23/2025 139  136 - 145 mmol/L Final    Potassium 04/23/2025 4.0  3.5 - 5.2 mmol/L Final    Chloride 04/23/2025 102  98 - 107 mmol/L Final    CO2 04/23/2025 26.0  22.0 - 29.0 mmol/L Final    Calcium 04/23/2025 9.4  8.6 - 10.5 mg/dL Final    Total Protein 04/23/2025 7.1  6.0 - 8.5 g/dL Final    Albumin 04/23/2025 4.2  3.5 - 5.2 g/dL Final    ALT (SGPT) 04/23/2025 37  1 - 41 U/L Final    AST (SGOT) 04/23/2025 32  1 - 40 U/L Final    Alkaline Phosphatase 04/23/2025 89  39 - 117 U/L Final    Total Bilirubin 04/23/2025 0.5  0.0 - 1.2 mg/dL Final    Globulin 04/23/2025 2.9  gm/dL Final    A/G Ratio 04/23/2025 1.4  g/dL Final    BUN/Creatinine Ratio 04/23/2025 19.4  7.0 - 25.0 Final    Anion Gap 04/23/2025 11.0  5.0 - 15.0 mmol/L Final    eGFR 04/23/2025 78.1  >60.0 mL/min/1.73 Final    TSH 04/23/2025 2.580  0.270 - 4.200 uIU/mL Final    Hemoglobin A1C 04/23/2025 5.50  4.80 - 5.60 % Final    Total Cholesterol 04/23/2025 138  0 - 200 mg/dL Final    Triglycerides 04/23/2025 130  0 - 150 mg/dL Final    HDL Cholesterol 04/23/2025 54  40 - 60 mg/dL Final    LDL Cholesterol  04/23/2025 61  0 - 100 mg/dL Final    VLDL Cholesterol 04/23/2025 23  5 - 40 mg/dL Final    LDL/HDL Ratio 04/23/2025 1.07   Final    Color, UA 04/23/2025 Yellow  Yellow, Straw Final    Appearance, UA 04/23/2025 Clear  Clear Final    pH, UA 04/23/2025 5.5  5.0 - 8.0 Final    Specific Gravity, UA 04/23/2025 1.025  1.005 - 1.030 Final    Glucose, UA 04/23/2025 Negative  Negative Final    Ketones, UA 04/23/2025 Trace (A)  Negative Final    Bilirubin, UA 04/23/2025 Negative  Negative Final    Blood, UA 04/23/2025 Moderate (2+) (A)  Negative Final    Protein, UA 04/23/2025 30 mg/dL (1+) (A)  Negative Final    Leuk Esterase, UA 04/23/2025 Negative  Negative Final    Nitrite, UA 04/23/2025 Negative  Negative Final    Urobilinogen, UA 04/23/2025  1.0 E.U./dL  0.2 - 1.0 E.U./dL Final    25 Hydroxy, Vitamin D 04/23/2025 40.2  30.0 - 100.0 ng/ml Final    Vitamin B-12 04/23/2025 613  211 - 946 pg/mL Final    Extra Tube 04/23/2025 Hold for add-ons.   Final    Auto resulted.    RBC, UA 04/23/2025 6-10 (A)  None Seen, 0-2 /HPF Final    WBC, UA 04/23/2025 0-2  None Seen, 0-2 /HPF Final    Bacteria, UA 04/23/2025 None Seen  None Seen /HPF Final    Squamous Epithelial Cells, UA 04/23/2025 0-2  None Seen, 0-2 /HPF Final    Hyaline Casts, UA 04/23/2025 0-2  None Seen /LPF Final    Methodology 04/23/2025 Automated Microscopy   Final     Results  Labs   - Liver Enzymes: 09/2024, Slightly elevated    Results review: During today's encounter, all relevant clinical data has been reviewed.      Assessment and Plan  Diagnoses and all orders for this visit:    1. Mixed hyperlipidemia (Primary)  -     Lipid panel; Future    2. Primary hypertension    3. Other fatigue  -     CBC (No Diff); Future  -     Comprehensive metabolic panel; Future  -     TSH; Future  -     Urinalysis With Culture If Indicated -; Future  -     Vitamin B12; Future    4. Hyperglycemia  -     Hemoglobin A1c; Future    5. Vitamin D deficiency  -     Vitamin D 25 hydroxy; Future    6. Chronic low back pain with sciatica, sciatica laterality unspecified, unspecified back pain laterality  -     ketorolac (TORADOL) injection 30 mg  -     dexAMETHasone (DECADRON) injection 8 mg    Other orders  -     methylPREDNISolone (MEDROL) 4 MG dose pack; Take as directed on package instructions.  Dispense: 21 tablet; Refill: 0  -     Semaglutide-Weight Management 0.25 MG/0.5ML solution auto-injector; Inject 0.5 mL under the skin into the appropriate area as directed 1 (One) Time Per Week.  Dispense: 2 mL; Refill: 1  -     Discontinue: dexAMETHasone (DECADRON) injection 8 mg      Assessment & Plan  Lower back pain.  - Reports a 50-year history of recurring lower back pain, currently in a flare-up.  - Pain radiates down the  sciatic nerve; previous x-rays showed involvement of L4, L5.  - Toradol injection will be administered today.  - A steroid pack will be prescribed to start tomorrow or the next day if the pain does not completely ease off.    Elevated blood pressure.  - Blood pressure today is 138/88, above the target of <130/80.  - Attributes elevation to current back pain   - Advised to monitor blood pressure regularly.  - Encouraged to aim for target blood pressure of <130/80.    Obesity.  - Has not been able to obtain Zepbound due to pharmacy or insurance issues.  - Insurance now covers Wegovy; prescription for Wegovy will be provided.  - To be taken at the same dose for approximately 4 weeks; contact office if dose increase is desired.  - If experiencing manageable side effects such as nausea or sour burps, can continue at the same dose.    Health maintenance.  - Liver enzymes were slightly elevated 7 months ago; has a history of elevated liver enzymes but no diagnosis of fatty liver.  - Labs will be ordered today to recheck liver function, kidney function, electrolytes, blood levels, thyroid, A1c, and urine for protein or blood.  - Vitamin D levels will also be checked.    Follow-up  - Follow-up in 6 months.    PROCEDURE  Procedure: Toradol and Steroid Injection for Lower Back Pain    All questions were answered and agreement to proceed was given after the following Pre-Procedure details were reviewed:  - Risks and Benefits: Pain relief, potential side effects of medications, risk of infection at injection site  - Alternative Options: Chiropractic care, oral pain medications, physical therapy  - Side effects: Possible side effects of Toradol and steroids, including gastrointestinal issues, increased blood pressure, and potential for infection  - Consent: Verbal consent obtained from the patient    Intra-Procedure:  - Time-Out: Confirmed patient's identity, procedure, and site of injection  - Site Preparation: Cleaned and  disinfected the injection site  - Medication: Administered Toradol and steroid injections  - Dressing: Applied a small adhesive bandage over the injection site    Post-Procedure:  - Tolerance Level: Patient tolerated the procedure well  - Home Care Instructions: Advised to monitor for signs of infection at the injection site, avoid strenuous activities, and follow up if pain persists or worsens            New Medications:   New Medications Ordered This Visit   Medications    methylPREDNISolone (MEDROL) 4 MG dose pack     Sig: Take as directed on package instructions.     Dispense:  21 tablet     Refill:  0    Semaglutide-Weight Management 0.25 MG/0.5ML solution auto-injector     Sig: Inject 0.5 mL under the skin into the appropriate area as directed 1 (One) Time Per Week.     Dispense:  2 mL     Refill:  1    ketorolac (TORADOL) injection 30 mg    dexAMETHasone (DECADRON) injection 8 mg       Discontinued Medications:   Medications Discontinued During This Encounter   Medication Reason    dexAMETHasone (DECADRON) injection 8 mg               Visit Diagnoses:    ICD-10-CM ICD-9-CM   1. Mixed hyperlipidemia  E78.2 272.2   2. Primary hypertension  I10 401.9   3. Other fatigue  R53.83 780.79   4. Hyperglycemia  R73.9 790.29   5. Vitamin D deficiency  E55.9 268.9   6. Chronic low back pain with sciatica, sciatica laterality unspecified, unspecified back pain laterality  M54.40 724.2    G89.29 724.3     338.29            Follow Up:   Return in about 6 months (around 10/23/2025) for labs today .    Patient was given instructions and counseling regarding his condition or for health maintenance advice. Please see specific information pulled into the AVS if appropriate.     Patient or patient representative verbalized consent for the use of Ambient Listening during the visit with  EDGAR Bolivar for chart documentation. 4/25/2025  17:54 EDT      This document has been electronically signed by EDGAR Bolivar   April  25, 2025 17:57 EDT    Dictated Utilizing Dragon Dictation: Part of this note may be an electronic transcription/translation of spoken language to printed text using the Dragon Dictation System.

## 2025-04-28 ENCOUNTER — RESULTS FOLLOW-UP (OUTPATIENT)
Dept: FAMILY MEDICINE CLINIC | Facility: CLINIC | Age: 70
End: 2025-04-28
Payer: COMMERCIAL

## 2025-04-28 DIAGNOSIS — R31.9 HEMATURIA, UNSPECIFIED TYPE: Primary | ICD-10-CM

## 2025-04-28 NOTE — PROGRESS NOTES
Can we let him know no big concerns on his labs. Liver enzymes have returned to WNL. Everything else looks great, except for he did have blood in his urine which I know was expecting. Did he say he had seen urology before for evaluation of that?

## 2025-05-01 ENCOUNTER — TELEPHONE (OUTPATIENT)
Dept: FAMILY MEDICINE CLINIC | Facility: CLINIC | Age: 70
End: 2025-05-01
Payer: COMMERCIAL

## 2025-05-01 NOTE — TELEPHONE ENCOUNTER
PA for Wegovy was approved through 10/28/2025. I contacted the [facility] pharmacy to verify coverage and they stated that the med was approved but had a copay of $642. I contacted the pt and informed him of the approval and copay and informed him to contact us back with any further problems or concerns. The pt verbalized understanding.

## 2025-05-02 NOTE — TELEPHONE ENCOUNTER
"Caller: Barrie Rose \"OFELIA\"    Relationship: Self    Best call back number: [phone]     Requested Prescriptions:   Requested Prescriptions     Pending Prescriptions Disp Refills    Semaglutide-Weight Management 0.25 MG/0.5ML solution auto-injector 2 mL 1     Sig: Inject 0.5 mL under the skin into the appropriate area as directed 1 (One) Time Per Week.        Pharmacy where request should be sent: [facility] - [address] - [address] [facility] AT [address] TO [facility] - [phone]  - [phone] FX     Last office visit with prescribing clinician: 4/23/2025   Last telemedicine visit with prescribing clinician: Visit date not found   Next office visit with prescribing clinician: 10/23/2025     Additional details provided by patient: PATIENT STATES THAT THE WEGOVY PRESCRIPTION WILL BE $500 CHEAPER AT THIS PHARMACY THAN AT [facility]. PLEASE RESEND PRESCRIPTION TO [facility].       Ruben Rivera Rep   05/02/25 11:33 EDT           "

## 2025-05-06 NOTE — TELEPHONE ENCOUNTER
"    Caller: Barrie Rose \"OFELIA\"    Relationship: Self    Best call back number: [phone]     Requested Prescriptions:   Requested Prescriptions     Pending Prescriptions Disp Refills    Semaglutide-Weight Management 0.25 MG/0.5ML solution auto-injector 2 mL 1     Sig: Inject 0.5 mL under the skin into the appropriate area as directed 1 (One) Time Per Week.        Pharm[facility] where request [facility] be sent[facility] [place] [facility] - [address] [facility] AT [address] TO [facility] - [phone]  - [phone] FX     Last office visit with prescribing clinician: 4/23/2025   Last telemedicine visit with prescribing clinician: Visit date not found   Next office visit with prescribing clinician: 10/23/2025     Additional details provided by patient:     Does the patient have less than a 3 day supply:  [x] Yes  [] No    Would you like a call back once the refill request has been completed: [] Yes [] No    If the office needs to give you a call back, can they leave a voicemail: [] Yes [] No    Ruben Walker Rep   05/06/25 11:42 EDT   "

## 2025-05-08 ENCOUNTER — TELEPHONE (OUTPATIENT)
Dept: FAMILY MEDICINE CLINIC | Facility: CLINIC | Age: 70
End: 2025-05-08
Payer: COMMERCIAL

## 2025-05-08 NOTE — TELEPHONE ENCOUNTER
Yes, I tried to send it through but not sure it went it put up a transmission failed message, can we check and see if theres a way to fix that?

## 2025-05-08 NOTE — TELEPHONE ENCOUNTER
Patient called requesting his Wegovy be resent to his mail order due to cost.Where it is mail order do you want to send a larger quantity?

## 2025-05-09 ENCOUNTER — TELEPHONE (OUTPATIENT)
Dept: FAMILY MEDICINE CLINIC | Facility: CLINIC | Age: 70
End: 2025-05-09
Payer: COMMERCIAL

## 2025-06-25 RX ORDER — HYDROCHLOROTHIAZIDE 25 MG/1
25 TABLET ORAL DAILY
Qty: 90 TABLET | Refills: 0 | Status: SHIPPED | OUTPATIENT
Start: 2025-06-25 | End: 2025-09-23

## 2025-06-25 RX ORDER — LOSARTAN POTASSIUM 100 MG/1
100 TABLET ORAL DAILY
Qty: 90 TABLET | Refills: 0 | Status: SHIPPED | OUTPATIENT
Start: 2025-06-25 | End: 2025-09-23

## 2025-07-15 DIAGNOSIS — E66.09 CLASS 1 OBESITY DUE TO EXCESS CALORIES WITH SERIOUS COMORBIDITY AND BODY MASS INDEX (BMI) OF 31.0 TO 31.9 IN ADULT: Primary | ICD-10-CM

## 2025-07-15 DIAGNOSIS — E66.811 CLASS 1 OBESITY DUE TO EXCESS CALORIES WITH SERIOUS COMORBIDITY AND BODY MASS INDEX (BMI) OF 31.0 TO 31.9 IN ADULT: Primary | ICD-10-CM

## 2025-07-15 NOTE — TELEPHONE ENCOUNTER
"Caller: Barrie Rose \"OFELIA\"    Relationship: Self    Best call back number: [phone]     What medication are you requesting: CELECOXIB 200MG TAKE ONE CAPSULE DAILY WITH LUNCH    What are your current symptoms: FOR RUMATOID ARTHRITIS    Have you had these symptoms before:    [x] Yes  [] No    Have you been treated for these symptoms before:   [x] Yes  [] No    If a prescription is needed, what is your preferred pharmacy and phone number: [facility] [id number] - [address] - [phone] [facility] [phone] FX   "

## 2025-07-16 NOTE — TELEPHONE ENCOUNTER
Spoke with patient he reports he has been on it ran out was getting when was seeing [facility] provider,asking for Wegovy to be sent to [facility] he did not fill it previously due to cost but is wanting to try to get it through mail order cheaper.

## 2025-07-18 RX ORDER — CELECOXIB 200 MG/1
200 CAPSULE ORAL
Qty: 90 CAPSULE | Refills: 0 | Status: SHIPPED | OUTPATIENT
Start: 2025-07-18 | End: 2025-10-16

## 2025-07-18 NOTE — TELEPHONE ENCOUNTER
Can we make sure that went through on [place]. It keeps giving me an error message, if not can we call it in for him or try to resend?

## 2025-08-01 ENCOUNTER — OFFICE VISIT (OUTPATIENT)
Dept: UROLOGY | Facility: CLINIC | Age: 70
End: 2025-08-01
Payer: COMMERCIAL

## 2025-08-01 VITALS
SYSTOLIC BLOOD PRESSURE: 148 MMHG | BODY MASS INDEX: 32.5 KG/M2 | DIASTOLIC BLOOD PRESSURE: 88 MMHG | HEART RATE: 81 BPM | WEIGHT: 245.2 LBS | HEIGHT: 73 IN

## 2025-08-01 DIAGNOSIS — R31.29 MICROSCOPIC HEMATURIA: Primary | ICD-10-CM

## 2025-08-01 LAB
BILIRUB BLD-MCNC: NEGATIVE MG/DL
CLARITY, POC: CLEAR
COLOR UR: YELLOW
EXPIRATION DATE: ABNORMAL
GLUCOSE UR STRIP-MCNC: NEGATIVE MG/DL
KETONES UR QL: NEGATIVE
LEUKOCYTE EST, POC: NEGATIVE
Lab: ABNORMAL
NITRITE UR-MCNC: NEGATIVE MG/ML
PH UR: 6 [PH] (ref 5–8)
PROT UR STRIP-MCNC: NEGATIVE MG/DL
RBC # UR STRIP: ABNORMAL /UL
SP GR UR: 1 (ref 1–1.03)
UROBILINOGEN UR QL: NORMAL

## 2025-08-01 RX ORDER — AMLODIPINE BESYLATE 5 MG/1
5 TABLET ORAL DAILY
COMMUNITY

## 2025-08-01 NOTE — PROGRESS NOTES
"Chief Complaint:    Chief Complaint   Patient presents with    Blood in Urine       Vital Signs:   /88   Pulse 81   Ht 185.4 cm (72.99\")   Wt 111 kg (245 lb 3.2 oz)   BMI 32.36 kg/m²   Body mass index is 32.36 kg/m².      HPI:  Barrie Rose is a 69 y.o. male who presents today for initial evaluation     History of Present Illness  Mr. Rose presents to the clinic today for evaluation of microscopic hematuria.  He has been referred to us by EDGAR Bolivar.  Patient reports that he has had longstanding persistent microscopic blood in his urine now for 40+ years.  He denies any known history of gross hematuria, nephrolithiasis, recurrent urinary tract infections, or difficulty with urination.  Denies any known family history of bladder, kidney, or urethral carcinomas.  He has had serial PSA testing's over the past 3 years they have remained stable at 0.4 with the most recent 1 in September 2024.  He does not currently take any medications for lower urinary tract symptoms.  He has never had an appropriate workup previously.  Urine analysis in office today does show 2+ microscopic blood with no concerns of leukocytes or nitrites.      Past Medical History:  Past Medical History:   Diagnosis Date    Allergic     Cancer     hx of skin cancer    Erectile dysfunction 2003    Gout     Hypertension     Low back pain        Current Meds:  Current Outpatient Medications   Medication Sig Dispense Refill    amLODIPine (NORVASC) 5 MG tablet Take 1 tablet by mouth Daily.      atorvastatin (LIPITOR) 20 MG tablet Take 1 tablet by mouth every night at bedtime for 360 days. 90 tablet 3    celecoxib (CeleBREX) 200 MG capsule Take 1 capsule by mouth Daily With Lunch for 90 days. 90 capsule 0    cyclobenzaprine (FLEXERIL) 5 MG tablet Take 1 tablet by mouth 2 (Two) Times a Day As Needed for Muscle Spasms. 42 tablet 0    hydroCHLOROthiazide 25 MG tablet TAKE 1 TABLET BY MOUTH DAILY FOR 90 DAYS 90 tablet 0    losartan " (COZAAR) 100 MG tablet TAKE 1 TABLET BY MOUTH DAILY FOR 90 DAYS 90 tablet 0    methylPREDNISolone (MEDROL) 4 MG dose pack Take as directed on package instructions. 21 tablet 0    Semaglutide-Weight Management 0.25 MG/0.5ML solution auto-injector Inject 0.5 mL under the skin into the appropriate area as directed 1 (One) Time Per Week. 6 mL 0    sildenafil (Viagra) 100 MG tablet Take 1 tablet by mouth As Needed.       No current facility-administered medications for this visit.        Allergies:   Allergies   Allergen Reactions    Bee Venom Swelling        Past Surgical History:  Past Surgical History:   Procedure Laterality Date    APPENDECTOMY      CARDIOVASCULAR STRESS TEST  10/02/2023    4.50 Min. 7.0 METS. 88% THR. 196/73. EF 63%. Inferior Infarct Vs Diaphragmatic attenuation    COLONOSCOPY      CYST REMOVAL Left 01/18/2023    BACK OF LEFT LEG    ECHO - CONVERTED  10/02/2023    TLS. EF 65%. Trace-Mild MR. AO- 3.9. RVSP- 12 mmHg    VASECTOMY         Social History:  Social History     Socioeconomic History    Marital status:    Tobacco Use    Smoking status: Every Day     Current packs/day: 0.25     Average packs/day: 0.3 packs/day for 64.5 years (16.6 ttl pk-yrs)     Types: Cigars, Cigarettes     Start date: 1/1/1970     Passive exposure: Current    Smokeless tobacco: Never    Tobacco comments:     Two cigara per day.  Do not inhale   Vaping Use    Vaping status: Never Used   Substance and Sexual Activity    Alcohol use: Yes     Alcohol/week: 10.0 standard drinks of alcohol     Types: 10 Shots of liquor per week     Comment: Evening to relax    Drug use: Never    Sexual activity: Yes     Partners: Female     Birth control/protection: None       Family History:  Family History   Problem Relation Age of Onset    No Known Problems Mother     Heart attack Father         11 stents    Cancer Father     Heart disease Father     Hypertension Father     Hypertension Sister     Heart disease Paternal Grandmother      No Known Problems Son        Review of Systems:  Review of Systems   Constitutional:  Negative for fatigue, fever and unexpected weight change.   Respiratory:  Negative for chest tightness and shortness of breath.    Cardiovascular:  Negative for chest pain.   Gastrointestinal:  Negative for abdominal pain, constipation, diarrhea, nausea and vomiting.   Genitourinary:  Positive for hematuria. Negative for difficulty urinating, dysuria, frequency and urgency.   Skin:  Negative for rash.   Psychiatric/Behavioral:  Negative for confusion and suicidal ideas.        Physical Exam:  Physical Exam  Constitutional:       General: He is not in acute distress.     Appearance: Normal appearance.   HENT:      Head: Normocephalic and atraumatic.      Nose: Nose normal.      Mouth/Throat:      Mouth: Mucous membranes are moist.   Eyes:      Conjunctiva/sclera: Conjunctivae normal.   Cardiovascular:      Rate and Rhythm: Normal rate.      Pulses: Normal pulses.   Pulmonary:      Effort: Pulmonary effort is normal.   Abdominal:      Palpations: Abdomen is soft.   Musculoskeletal:         General: Normal range of motion.      Cervical back: Normal range of motion.   Skin:     General: Skin is warm.   Neurological:      General: No focal deficit present.      Mental Status: He is alert and oriented to person, place, and time.   Psychiatric:         Mood and Affect: Mood normal.         Behavior: Behavior normal.         Thought Content: Thought content normal.         Judgment: Judgment normal.           Recent Image (CT and/or KUB):   CT Abdomen and Pelvis: No results found for this or any previous visit.     CT Stone Protocol: No results found for this or any previous visit.     KUB: No results found for this or any previous visit.       Labs:  Brief Urine Lab Results  (Last result in the past 365 days)        Color   Clarity   Blood   Leuk Est   Nitrite   Protein   CREAT   Urine HCG        08/01/25 1446 Yellow   Clear   2+    Negative   Negative   Negative                 Office Visit on 08/01/2025   Component Date Value Ref Range Status    Color 08/01/2025 Yellow  Yellow, Straw, Dark Yellow, Capri Final    Clarity, UA 08/01/2025 Clear  Clear Final    Specific Gravity  08/01/2025 1.005  1.005 - 1.030 Final    pH, Urine 08/01/2025 6.0  5.0 - 8.0 Final    Leukocytes 08/01/2025 Negative  Negative Final    Nitrite, UA 08/01/2025 Negative  Negative Final    Protein, POC 08/01/2025 Negative  Negative mg/dL Final    Glucose, UA 08/01/2025 Negative  Negative mg/dL Final    Ketones, UA 08/01/2025 Negative  Negative Final    Urobilinogen, UA 08/01/2025 Normal  Normal, 0.2 E.U./dL Final    Bilirubin 08/01/2025 Negative  Negative Final    Blood, UA 08/01/2025 2+ (A)  Negative Final    Lot Number 08/01/2025 [id number]   Final    Expiration Date 08/01/2025 5/1/2026   Final        Procedure: None  Procedures     I have reviewed and agree with the above PMH, PSH, FMH, social history, medications, allergies, and labs.     Assessment/Plan:   Problem List Items Addressed This Visit    None  Visit Diagnoses         Microscopic hematuria    -  Primary    Relevant Medications    amLODIPine (NORVASC) 5 MG tablet    Other Relevant Orders    POC Urinalysis Dipstick, Automated (Completed)    CT Abdomen Pelvis With & Without Contrast            Health Maintenance:   Health Maintenance Due   Topic Date Due    COLORECTAL CANCER SCREENING  11/14/2025        Smoking Counseling: Everyday smoker.  Never used smokeless tobacco.  Counseling given however not ready to at this time.    Urine Incontinence: Patient reports that he is not currently experiencing any symptoms of urinary incontinence.    Patient was given instructions and counseling regarding his condition or for health maintenance advice. Please see specific information pulled into the AVS if appropriate.    Patient Education:   Microscopic hematuria -discussed with the patient the pathophysiology of this  condition in detail.  Did discuss causes which can include but not limited to cystitis, carcinomas, recurrent urinary tract infections, nephrolithiasis, glomerulonephritis, autoimmune conditions such as lupus, urethritis, BPH, prostate cancer, or other urological abnormalities.  Did discuss appropriate workup which can include a CT scan abdomen pelvis with and without contrast.  I have no previous imaging on file and patient does agree to proceed forward with this.  Did discuss further workup with lower tract investigation with cystoscopy however he declined at this time.  Will have the patient complete a CT scan and call with those results once available.  Otherwise we will place him back in 6 months for repeat urinalysis and possible cytology at that time pending CT he verbalized understanding    Visit Diagnoses:    ICD-10-CM ICD-9-CM   1. Microscopic hematuria  R31.29 [id number]     A total of 30 minutes were spent coordinating this patient’s care in clinic today; 15 minutes of which were face-to-face with the patient, reviewing medical history and counseling on the current treatment and followup plan.  All questions were answered to patient's satisfaction.    Meds Ordered During Visit:  No orders of the defined types were placed in this encounter.      Follow Up Appointment: 6 months  No follow-ups on file.      This document has been electronically signed by Aubrey Flaherty PA-C   August 1, 2025 15:25 EDT    Part of this note may be an electronic transcription/translation of spoken language to printed text using the Dragon Dictation System.

## 2025-08-06 ENCOUNTER — PATIENT ROUNDING (BHMG ONLY) (OUTPATIENT)
Dept: UROLOGY | Facility: CLINIC | Age: 70
End: 2025-08-06
Payer: COMMERCIAL

## 2025-08-12 ENCOUNTER — HOSPITAL ENCOUNTER (OUTPATIENT)
Dept: CT IMAGING | Facility: HOSPITAL | Age: 70
Discharge: HOME | End: 2025-08-12
Payer: COMMERCIAL

## 2025-08-12 ENCOUNTER — RESULTS FOLLOW-UP (OUTPATIENT)
Dept: UROLOGY | Facility: CLINIC | Age: 70
End: 2025-08-12
Payer: COMMERCIAL

## 2025-08-12 DIAGNOSIS — K86.2 PANCREATIC CYST: ICD-10-CM

## 2025-08-12 DIAGNOSIS — R31.29 MICROSCOPIC HEMATURIA: ICD-10-CM

## 2025-08-12 DIAGNOSIS — N28.1 BILATERAL RENAL CYSTS: Primary | ICD-10-CM

## 2025-08-12 PROCEDURE — 25510000001 IOPAMIDOL 61 % SOLUTION

## 2025-08-12 PROCEDURE — 74178 CT ABD&PLV WO CNTR FLWD CNTR: CPT

## 2025-08-12 RX ORDER — IOPAMIDOL 612 MG/ML
100 INJECTION, SOLUTION INTRAVASCULAR
Status: COMPLETED | OUTPATIENT
Start: 2025-08-12 | End: 2025-08-12

## 2025-08-12 RX ADMIN — IOPAMIDOL 100 ML: 612 INJECTION, SOLUTION INTRAVENOUS at 10:25

## 2025-08-28 ENCOUNTER — HOSPITAL ENCOUNTER (OUTPATIENT)
Dept: MRI IMAGING | Facility: HOSPITAL | Age: 70
Discharge: HOME | End: 2025-08-28
Payer: COMMERCIAL

## 2025-08-28 DIAGNOSIS — N28.1 BILATERAL RENAL CYSTS: ICD-10-CM

## 2025-08-28 DIAGNOSIS — K86.2 PANCREATIC CYST: ICD-10-CM

## 2025-08-28 PROCEDURE — A9573 GADOPICLENOL 0.5 MMOL/ML SOLUTION: HCPCS

## 2025-08-28 PROCEDURE — 25510000001 GADOPICLENOL 0.5 MMOL/ML SOLUTION

## 2025-08-28 PROCEDURE — 74183 MRI ABD W/O CNTR FLWD CNTR: CPT

## 2025-08-28 RX ADMIN — GADOPICLENOL 10 ML: 485.1 INJECTION INTRAVENOUS at 17:47

## 2025-08-29 ENCOUNTER — RESULTS FOLLOW-UP (OUTPATIENT)
Dept: UROLOGY | Facility: CLINIC | Age: 70
End: 2025-08-29
Payer: COMMERCIAL

## 2025-08-29 DIAGNOSIS — K86.2 PANCREATIC CYST: ICD-10-CM

## 2025-08-29 DIAGNOSIS — N28.1 BILATERAL RENAL CYSTS: Primary | ICD-10-CM
